# Patient Record
Sex: MALE | Race: WHITE | Employment: OTHER | URBAN - METROPOLITAN AREA
[De-identification: names, ages, dates, MRNs, and addresses within clinical notes are randomized per-mention and may not be internally consistent; named-entity substitution may affect disease eponyms.]

---

## 2017-02-22 RX ORDER — ROSUVASTATIN CALCIUM 40 MG/1
40 TABLET, COATED ORAL DAILY
Qty: 90 TAB | Refills: 1 | Status: SHIPPED | OUTPATIENT
Start: 2017-02-22 | End: 2017-02-23 | Stop reason: SDUPTHER

## 2017-02-23 RX ORDER — ROSUVASTATIN CALCIUM 40 MG/1
40 TABLET, COATED ORAL DAILY
Qty: 90 TAB | Refills: 1 | Status: SHIPPED | OUTPATIENT
Start: 2017-02-23 | End: 2018-03-30 | Stop reason: SDUPTHER

## 2017-03-20 ENCOUNTER — OFFICE VISIT (OUTPATIENT)
Dept: CARDIOLOGY CLINIC | Age: 82
End: 2017-03-20

## 2017-03-20 DIAGNOSIS — Z95.0 CARDIAC PACEMAKER IN SITU: Primary | ICD-10-CM

## 2017-06-17 ENCOUNTER — HOSPITAL ENCOUNTER (EMERGENCY)
Age: 82
Discharge: HOME | End: 2017-06-17
Attending: EMERGENCY MEDICINE
Payer: MEDICARE

## 2017-06-17 ENCOUNTER — APPOINTMENT (OUTPATIENT)
Dept: GENERAL RADIOLOGY | Age: 82
End: 2017-06-17
Attending: EMERGENCY MEDICINE
Payer: MEDICARE

## 2017-06-17 VITALS
WEIGHT: 183 LBS | HEIGHT: 70 IN | SYSTOLIC BLOOD PRESSURE: 120 MMHG | HEART RATE: 62 BPM | OXYGEN SATURATION: 98 % | DIASTOLIC BLOOD PRESSURE: 64 MMHG | BODY MASS INDEX: 26.2 KG/M2 | RESPIRATION RATE: 15 BRPM

## 2017-06-17 DIAGNOSIS — R07.9 CHEST PAIN, UNSPECIFIED TYPE: Primary | ICD-10-CM

## 2017-06-17 DIAGNOSIS — Z95.0 PACEMAKER: ICD-10-CM

## 2017-06-17 LAB
ALBUMIN SERPL BCP-MCNC: 3.7 G/DL (ref 3.5–5)
ALBUMIN/GLOB SERPL: 0.9 {RATIO} (ref 1.1–2.2)
ALP SERPL-CCNC: 80 U/L (ref 45–117)
ALT SERPL-CCNC: 17 U/L (ref 12–78)
ANION GAP BLD CALC-SCNC: 5 MMOL/L (ref 5–15)
AST SERPL W P-5'-P-CCNC: 18 U/L (ref 15–37)
BASOPHILS # BLD AUTO: 0 K/UL (ref 0–0.1)
BASOPHILS # BLD: 0 % (ref 0–1)
BILIRUB SERPL-MCNC: 0.3 MG/DL (ref 0.2–1)
BUN SERPL-MCNC: 32 MG/DL (ref 6–20)
BUN/CREAT SERPL: 22 (ref 12–20)
CALCIUM SERPL-MCNC: 8.5 MG/DL (ref 8.5–10.1)
CHLORIDE SERPL-SCNC: 100 MMOL/L (ref 97–108)
CO2 SERPL-SCNC: 32 MMOL/L (ref 21–32)
CREAT SERPL-MCNC: 1.43 MG/DL (ref 0.7–1.3)
EOSINOPHIL # BLD: 0.2 K/UL (ref 0–0.4)
EOSINOPHIL NFR BLD: 5 % (ref 0–7)
ERYTHROCYTE [DISTWIDTH] IN BLOOD BY AUTOMATED COUNT: 15.2 % (ref 11.5–14.5)
GLOBULIN SER CALC-MCNC: 4.3 G/DL (ref 2–4)
GLUCOSE SERPL-MCNC: 140 MG/DL (ref 65–100)
HCT VFR BLD AUTO: 35.2 % (ref 36.6–50.3)
HGB BLD-MCNC: 11.3 G/DL (ref 12.1–17)
LYMPHOCYTES # BLD AUTO: 24 % (ref 12–49)
LYMPHOCYTES # BLD: 1.2 K/UL (ref 0.8–3.5)
MAGNESIUM SERPL-MCNC: 2.2 MG/DL (ref 1.6–2.4)
MCH RBC QN AUTO: 28.7 PG (ref 26–34)
MCHC RBC AUTO-ENTMCNC: 32.1 G/DL (ref 30–36.5)
MCV RBC AUTO: 89.3 FL (ref 80–99)
MONOCYTES # BLD: 0.5 K/UL (ref 0–1)
MONOCYTES NFR BLD AUTO: 10 % (ref 5–13)
NEUTS SEG # BLD: 3.2 K/UL (ref 1.8–8)
NEUTS SEG NFR BLD AUTO: 61 % (ref 32–75)
PLATELET # BLD AUTO: 202 K/UL (ref 150–400)
POTASSIUM SERPL-SCNC: 4.2 MMOL/L (ref 3.5–5.1)
PROT SERPL-MCNC: 8 G/DL (ref 6.4–8.2)
RBC # BLD AUTO: 3.94 M/UL (ref 4.1–5.7)
SODIUM SERPL-SCNC: 137 MMOL/L (ref 136–145)
TROPONIN I SERPL-MCNC: <0.04 NG/ML
WBC # BLD AUTO: 5.3 K/UL (ref 4.1–11.1)

## 2017-06-17 PROCEDURE — 99284 EMERGENCY DEPT VISIT MOD MDM: CPT

## 2017-06-17 PROCEDURE — 84484 ASSAY OF TROPONIN QUANT: CPT | Performed by: EMERGENCY MEDICINE

## 2017-06-17 PROCEDURE — 36415 COLL VENOUS BLD VENIPUNCTURE: CPT | Performed by: EMERGENCY MEDICINE

## 2017-06-17 PROCEDURE — 93005 ELECTROCARDIOGRAM TRACING: CPT

## 2017-06-17 PROCEDURE — 83735 ASSAY OF MAGNESIUM: CPT | Performed by: EMERGENCY MEDICINE

## 2017-06-17 PROCEDURE — 85025 COMPLETE CBC W/AUTO DIFF WBC: CPT | Performed by: EMERGENCY MEDICINE

## 2017-06-17 PROCEDURE — 71020 XR CHEST PA LAT: CPT

## 2017-06-17 PROCEDURE — 80053 COMPREHEN METABOLIC PANEL: CPT | Performed by: EMERGENCY MEDICINE

## 2017-06-17 RX ORDER — SODIUM CHLORIDE 0.9 % (FLUSH) 0.9 %
5-10 SYRINGE (ML) INJECTION AS NEEDED
Status: DISCONTINUED | OUTPATIENT
Start: 2017-06-17 | End: 2017-06-18 | Stop reason: HOSPADM

## 2017-06-17 RX ORDER — TRIAMTERENE/HYDROCHLOROTHIAZID 37.5-25 MG
1 TABLET ORAL DAILY
COMMUNITY
End: 2019-09-14

## 2017-06-17 RX ORDER — FUROSEMIDE 20 MG/1
20 TABLET ORAL DAILY
COMMUNITY

## 2017-06-17 RX ORDER — SODIUM CHLORIDE 0.9 % (FLUSH) 0.9 %
5-10 SYRINGE (ML) INJECTION EVERY 8 HOURS
Status: DISCONTINUED | OUTPATIENT
Start: 2017-06-17 | End: 2017-06-18 | Stop reason: HOSPADM

## 2017-06-17 RX ORDER — RAMIPRIL 10 MG/1
10 CAPSULE ORAL DAILY
COMMUNITY

## 2017-06-18 NOTE — ED NOTES
10:24 PM  Change of shift. Care of patient taken over from Dr. Deshaun Page; H&P reviewed, handoff complete. Awaiting pacemaker interrogation. 11:26 PM  Pacer interrogated. No problems detected. Discussed results and plan with patient. Patient will be discharged home with cardiology followup. Patient instructed to return to the emergency room for any worsening symptoms or any other concerns.

## 2017-06-18 NOTE — DISCHARGE INSTRUCTIONS
We hope that we have addressed all of your medical concerns. The examination and treatment you received in the Emergency Department were for an emergent problem and were not intended as complete care. It is important that you follow up with your healthcare provider(s) for ongoing care. If your symptoms worsen or do not improve as expected, and you are unable to reach your usual health care provider(s), you should return to the Emergency Department. Today's healthcare is undergoing tremendous change, and patient satisfaction surveys are one of the many tools to assess the quality of medical care. You may receive a survey from the [employer] regarding your experience in the Emergency Department. I hope that your experience has been completely positive, particularly the medical care that I provided. As such, please participate in the survey; anything less than excellent does not meet my expectations or intentions. Formerly [facility] and [address] [place] [address] participate in nationally recognized quality of care measures. If your blood pressure is greater than 120/80, as reported below, we urge that you seek medical care to address the potential of high blood pressure, commonly known as hypertension. Hypertension can be hereditary or can be caused by certain medical conditions, pain, stress, or \"white coat syndrome. \"       Please make an appointment with your health care provider(s) for follow up of your Emergency Department visit. VITALS:   Patient Vitals for the past 8 hrs:   Pulse Resp BP SpO2   06/17/17 2100 64 21 123/64 96 %          Thank you for allowing us to provide you with medical care today. We realize that you have many choices for your emergency care needs. Please choose us in the future for any continued health care needs. Ric Plascencia, [address] [place] [place].   Office: [phone]            Recent Results (from the past 24 hour(s))   CBC WITH AUTOMATED DIFF    Collection Time: 06/17/17  9:12 PM   Result Value Ref Range    WBC 5.3 4.1 - 11.1 K/uL    RBC 3.94 (L) 4.10 - 5.70 M/uL    HGB 11.3 (L) 12.1 - 17.0 g/dL    HCT 35.2 (L) 36.6 - 50.3 %    MCV 89.3 80.0 - 99.0 FL    MCH 28.7 26.0 - 34.0 PG    MCHC 32.1 30.0 - 36.5 g/dL    RDW 15.2 (H) 11.5 - 14.5 %    PLATELET 563 684 - 653 K/uL    NEUTROPHILS 61 32 - 75 %    LYMPHOCYTES 24 12 - 49 %    MONOCYTES 10 5 - 13 %    EOSINOPHILS 5 0 - 7 %    BASOPHILS 0 0 - 1 %    ABS. NEUTROPHILS 3.2 1.8 - 8.0 K/UL    ABS. LYMPHOCYTES 1.2 0.8 - 3.5 K/UL    ABS. MONOCYTES 0.5 0.0 - 1.0 K/UL    ABS. EOSINOPHILS 0.2 0.0 - 0.4 K/UL    ABS. BASOPHILS 0.0 0.0 - 0.1 K/UL   METABOLIC PANEL, COMPREHENSIVE    Collection Time: 06/17/17  9:12 PM   Result Value Ref Range    Sodium 137 136 - 145 mmol/L    Potassium 4.2 3.5 - 5.1 mmol/L    Chloride 100 97 - 108 mmol/L    CO2 32 21 - 32 mmol/L    Anion gap 5 5 - 15 mmol/L    Glucose 140 (H) 65 - 100 mg/dL    BUN 32 (H) 6 - 20 MG/DL    Creatinine 1.43 (H) 0.70 - 1.30 MG/DL    BUN/Creatinine ratio 22 (H) 12 - 20      GFR est AA 57 (L) >60 ml/min/1.73m2    GFR est non-AA 47 (L) >60 ml/min/1.73m2    Calcium 8.5 8.5 - 10.1 MG/DL    Bilirubin, total 0.3 0.2 - 1.0 MG/DL    ALT (SGPT) 17 12 - 78 U/L    AST (SGOT) 18 15 - 37 U/L    Alk. phosphatase 80 45 - 117 U/L    Protein, total 8.0 6.4 - 8.2 g/dL    Albumin 3.7 3.5 - 5.0 g/dL    Globulin 4.3 (H) 2.0 - 4.0 g/dL    A-G Ratio 0.9 (L) 1.1 - 2.2     MAGNESIUM    Collection Time: 06/17/17  9:12 PM   Result Value Ref Range    Magnesium 2.2 1.6 - 2.4 mg/dL   TROPONIN I    Collection Time: 06/17/17  9:12 PM   Result Value Ref Range    Troponin-I, Qt. <0.04 <0.05 ng/mL       Xr Chest Pa Lat    Result Date: 6/17/2017  INDICATION: Pacemaker firing. COMPARISON: July 27, 2016 FINDINGS: PA and lateral views of the chest demonstrate a stable cardiomediastinal silhouette.  The patient is status post median sternotomy and CABG. Left-sided pacemaker is unchanged in position. There are small bilateral pleural effusions with mild bibasilar atelectasis. The visualized osseous structures are unremarkable. IMPRESSION: Left-sided pacemaker is stable in position. Small bilateral pleural effusions with mild bibasilar atelectasis. Chest Pain: Care Instructions  Your Care Instructions  There are many things that can cause chest pain. Some are not serious and will get better on their own in a few days. But some kinds of chest pain need more testing and treatment. Your doctor may have recommended a follow-up visit in the next 8 to 12 hours. If you are not getting better, you may need more tests or treatment. Even though your doctor has released you, you still need to watch for any problems. The doctor carefully checked you, but sometimes problems can develop later. If you have new symptoms or if your symptoms do not get better, get medical care right away. If you have worse or different chest pain or pressure that lasts more than 5 minutes or you passed out (lost consciousness), call 911 or seek other emergency help right away. A medical visit is only one step in your treatment. Even if you feel better, you still need to do what your doctor recommends, such as going to all suggested follow-up appointments and taking medicines exactly as directed. This will help you recover and help prevent future problems. How can you care for yourself at home? · Rest until you feel better. · Take your medicine exactly as prescribed. Call your doctor if you think you are having a problem with your medicine. · Do not drive after taking a prescription pain medicine. When should you call for help? Call 911 if:  · You passed out (lost consciousness). · You have severe difficulty breathing. · You have symptoms of a heart attack.  These may include:  ¨ Chest pain or pressure, or a strange feeling in your chest.  ¨ Sweating. ¨ Shortness of breath. ¨ Nausea or vomiting. ¨ Pain, pressure, or a strange feeling in your back, neck, jaw, or upper belly or in one or both shoulders or arms. ¨ Lightheadedness or sudden weakness. ¨ A fast or irregular heartbeat. After you call 911, the  may tell you to chew 1 adult-strength or 2 to 4 low-dose aspirin. Wait for an ambulance. Do not try to drive yourself. Call your doctor today if:  · You have any trouble breathing. · Your chest pain gets worse. · You are dizzy or lightheaded, or you feel like you may faint. · You are not getting better as expected. · You are having new or different chest pain. Where can you learn more? Go to http://vimal-tucker.info/. Enter A120 in the search box to learn more about \"Chest Pain: Care Instructions. \"  Current as of: May 27, 2016  Content Version: 11.2  © 8239-2852 [employer]. Care instructions adapted under license by [employer] (which disclaims liability or warranty for this information). If you have questions about a medical condition or this instruction, always ask your healthcare professional. [place] [address] any warranty or liability for your use of this information.

## 2017-06-18 NOTE — ED PROVIDER NOTES
HPI Comments: 80 y.o. male with past medical history significant for coronary atherosclerosis, HTN, hyperlipidemia, type II diabetes, atrial fibrillation, CAD, and bilateral cataracts who presents from home with chief complaint of pacemaker problem. Pt states that for the past 3 weeks his pacemaker will occasionally \"shock him\". Pt reports that the shocking increased in frequency and intensity today. He states that it shocked him almost 10 times and reports that the shocks are more painful than usual. Pt states that he has had this pacemaker for approximately 3 years. He reports having a machine which he uses at night that \"monitors my heart while I sleep\". Pt states that the monitor has not sounded any alarms recently. He denies having any chest pain, SOB, N/V/D, fever, cough, appetite change, or abdominal pain. There are no other acute medical concerns at this time. Social hx: former smoker, no EtOH use, no drug use  PCP: Eliceo Cunningham MD    Note written by Ly Bolanos, as dictated by Aishwarya Kearney MD 8:52 PM      The history is provided by the patient. No  was used. Past Medical History:   Diagnosis Date    Atrial fibrillation ([address]     CAD (coronary artery disease)     Cataracts, bilateral     Coronary atherosclerosis of native coronary artery     Essential hypertension, benign     Other and unspecified hyperlipidemia     PAF (paroxysmal atrial fibrillation) ([address] 6/23/2016    Postsurgical aortocoronary bypass status     Pre-operative cardiovascular examination     Type II or unspecified type diabetes mellitus without mention of complication, not stated as uncontrolled        Past Surgical History:   Procedure Laterality Date    CARDIAC SURG PROCEDURE UNLIST      bypass    HX CHOLECYSTECTOMY      HX ORTHOPAEDIC      back    HX PACEMAKER      INS PPM/ICD LED DUAL ONLY  11/12/2013              No family history on file.     Social History     Social History  Marital status:      Spouse name: N/A    Number of children: N/A    Years of education: N/A     Occupational History    Not on file. Social History Main Topics    Smoking status: Former Smoker     Quit date: 6/4/1972    Smokeless tobacco: Never Used    Alcohol use No    Drug use: No    Sexual activity: Not on file     Other Topics Concern    Not on file     Social History Narrative         ALLERGIES: Penicillins    Review of Systems   Constitutional: Negative for appetite change, chills and fever. Respiratory: Negative for cough and shortness of breath. Cardiovascular: Negative for chest pain. Gastrointestinal: Negative for abdominal pain, diarrhea, nausea and vomiting. All other systems reviewed and are negative. There were no vitals filed for this visit. Physical Exam   Constitutional: He is oriented to person, place, and time. He appears well-developed and well-nourished. No distress. HENT:   Head: Normocephalic and atraumatic. Eyes: Conjunctivae are normal. No scleral icterus. Neck: Neck supple. No tracheal deviation present. Cardiovascular: Normal rate, regular rhythm, normal heart sounds and intact distal pulses. Exam reveals no gallop and no friction rub. No murmur heard. Pacemaker in place, left upper chest.   Pulmonary/Chest: Effort normal and breath sounds normal. He has no wheezes. He has no rales. Abdominal: Soft. He exhibits no distension. There is no tenderness. There is no rebound and no guarding. Musculoskeletal: He exhibits no edema. Neurological: He is alert and oriented to person, place, and time. Skin: Skin is warm and dry. No rash noted. Psychiatric: He has a normal mood and affect. Nursing note and vitals reviewed. Note written by Ly Adams, as dictated by Serafin Nation MD 8:52 PM    [facility]  ED Course       Procedures  ED EKG interpretation:  Rhythm: atrial paced; and regular .  Rate (approx.): 67; Axis: normal; ST/T wave: normal.  Note written by Ly Le, as dictated by Nicky Ramos MD 8:52 PM    CONSULT NOTE:  9:16 Gillian Xavier MD spoke with Dr. Love Morales, Consult for Cardiology. Discussed available diagnostic tests and clinical findings. He is in agreement with care plans as outlined. Agrees with plans for interrogation. A/P: pacemaker \"shocks\" for 3 weeks, became more frequent today; appears well, VSS; EKG shows paced rhythm; CXR shows leads in good position; CBC, CMP, trop ok; awaiting interrogation.   Nicky Ramos MD  9:55 PM

## 2017-06-19 LAB
ATRIAL RATE: 67 BPM
CALCULATED P AXIS, ECG09: 58 DEGREES
CALCULATED R AXIS, ECG10: 14 DEGREES
CALCULATED T AXIS, ECG11: 50 DEGREES
DIAGNOSIS, 93000: NORMAL
P-R INTERVAL, ECG05: 254 MS
Q-T INTERVAL, ECG07: 406 MS
QRS DURATION, ECG06: 94 MS
QTC CALCULATION (BEZET), ECG08: 429 MS
VENTRICULAR RATE, ECG03: 67 BPM

## 2017-06-22 ENCOUNTER — OFFICE VISIT (OUTPATIENT)
Dept: CARDIOLOGY CLINIC | Age: 82
End: 2017-06-22

## 2017-06-22 ENCOUNTER — CLINICAL SUPPORT (OUTPATIENT)
Dept: CARDIOLOGY CLINIC | Age: 82
End: 2017-06-22

## 2017-06-22 VITALS
HEART RATE: 64 BPM | WEIGHT: 186 LBS | SYSTOLIC BLOOD PRESSURE: 120 MMHG | BODY MASS INDEX: 26.63 KG/M2 | OXYGEN SATURATION: 98 % | HEIGHT: 70 IN | DIASTOLIC BLOOD PRESSURE: 60 MMHG | RESPIRATION RATE: 16 BRPM

## 2017-06-22 DIAGNOSIS — Z95.1 HX OF CABG: ICD-10-CM

## 2017-06-22 DIAGNOSIS — I49.5 SINUS NODE DYSFUNCTION (HCC): ICD-10-CM

## 2017-06-22 DIAGNOSIS — I10 ESSENTIAL HYPERTENSION, BENIGN: ICD-10-CM

## 2017-06-22 DIAGNOSIS — I25.10 ATHEROSCLEROSIS OF NATIVE CORONARY ARTERY OF NATIVE HEART WITHOUT ANGINA PECTORIS: ICD-10-CM

## 2017-06-22 DIAGNOSIS — N18.30 CKD (CHRONIC KIDNEY DISEASE) STAGE 3, GFR 30-59 ML/MIN (HCC): ICD-10-CM

## 2017-06-22 DIAGNOSIS — Z95.0 PACEMAKER: Primary | ICD-10-CM

## 2017-06-22 DIAGNOSIS — R29.6 FREQUENT FALLS: ICD-10-CM

## 2017-06-22 DIAGNOSIS — I48.0 PAF (PAROXYSMAL ATRIAL FIBRILLATION) (HCC): ICD-10-CM

## 2017-06-22 DIAGNOSIS — Z95.0 CARDIAC PACEMAKER IN SITU: Primary | ICD-10-CM

## 2017-06-22 NOTE — PROGRESS NOTES
Cardiac Electrophysiology Office  Note     Subjective:      Юлия Orta is a 80 y.o. man whom I had seen in 2013 and he was referred by Dr. Kiara Raza for sick sinus syndrome and symptoms of fatigue, sinus bradycardia. The patient has paroxysmal atrial fibrillation on medical therapy, Sotalol. He has not been taking this medication for months and his wife said he does not see Dr Kiara Raza either. He lives near [facility] and has been having pacer checked at [facility] office although he prefers to see me at [facility] office as it is more convenient to come this way via [address] or [place]  He has diabetes mellitus and prior CABG with Dr Sherman Expose years ago     He is here today for follow up. He was recently in the ED 6/17/17 with the c/o his pacemaker \"shocking\" him. The shocking sensation is very brief lasting a couple seconds. Associated symptoms include SOB and dizziness. No LE edema  He denies chest pain. He says he does get a little SOB when walking around but this is d/t his exposure to Asbestos exposure. He works out in the yard, cutting up trees and doing yard work. He does fall at least once a week. He denies lightheadedness, dizziness or LOC preceding the fall. He is followed by Dr Kanika Lainez, pulmonary associates. Lexiscan stress test 12/2016 normal LVEF and no ischemia. Echo 7/2016 LVEF 60 % to 65 %. No RWMA. Aortic sclerosis.      Patient Active Problem List   Diagnosis Code    Coronary atherosclerosis of native coronary artery I25.10    Postsurgical aortocoronary bypass status Z95.1    Essential hypertension, benign I10    Other and unspecified hyperlipidemia E78.5    Type II or unspecified type diabetes mellitus without mention of complication, not stated as uncontrolled E11.9    S/P placement of cardiac pacemaker Z95.0    Sinus node dysfunction (HCC) I49.5    PAF (paroxysmal atrial fibrillation) ([facility] I48.0     Current Outpatient Prescriptions   Medication Sig Dispense Refill    furosemide (LASIX) 20 mg tablet Take 20 mg by mouth daily.  triamterene-hydroCHLOROthiazide (MAXZIDE) 37.5-25 mg per tablet Take 1 Tab by mouth daily.  ramipril (ALTACE) 10 mg capsule Take 10 mg by mouth daily.  rosuvastatin (CRESTOR) 40 mg tablet Take 1 Tab by mouth daily. (Patient taking differently: Take 20 mg by mouth daily.) 90 Tab 1    nitroglycerin (NITROSTAT) 0.4 mg SL tablet 1 Tab by SubLINGual route every five (5) minutes as needed for Chest Pain. 25 Tab 3    aspirin delayed-release 81 mg tablet Take  by mouth daily.  pioglitazone (ACTOS) 30 mg tablet Take  by mouth daily. Allergies   Allergen Reactions    Penicillins Rash     Past Medical History:   Diagnosis Date    [facility]     CAD (coronary artery disease)     Cataracts, bilateral     Coronary atherosclerosis of native coronary artery     Essential hypertension, benign     Other and unspecified hyperlipidemia     PAF (paroxysmal atrial fibrillation) ([facility] 6/23/2016    Postsurgical aortocoronary bypass status     Pre-operative cardiovascular examination     Type II or unspecified type diabetes mellitus without mention of complication, not stated as uncontrolled      Past Surgical History:   Procedure Laterality Date    CARDIAC SURG PROCEDURE UNLIST      bypass    HX CHOLECYSTECTOMY      HX ORTHOPAEDIC      back    HX PACEMAKER      INS PPM/ICD LED DUAL ONLY  11/12/2013          No family history of pacer   Social History   Substance Use Topics    Smoking status: Former Smoker     Quit date: 6/4/1972    Smokeless tobacco: Never Used    Alcohol use No        Review of Systems:   Constitutional: Negative for fever, chills, weight loss, malaise/fatigue. HEENT: Negative for nosebleeds, vision changes. Respiratory: Negative for cough, hemoptysis, sputum production, and wheezing. Cardiovascular: Negative for syncope, and PND.    Gastrointestinal: Negative for nausea, vomiting, diarrhea, blood in stool and melena. Genitourinary: Negative for dysuria, and hematuria. Musculoskeletal: Negative for myalgias   Skin: Negative for rash. Heme: Does not bleed or bruise easily. Neurological: Negative for speech change and focal weakness     Objective:     Visit Vitals    /60 (BP 1 Location: Left arm, BP Patient Position: Sitting)    Pulse 64    Resp 16    Ht 5' 10\" (1.778 m)    Wt 186 lb (84.4 kg)    SpO2 98%    BMI 26.69 kg/m2        Physical Exam:   Constitutional: Well-nourished. No distress. Head: Normocephalic and atraumatic. Eyes: Pupils are equal, round  Neck: supple. No JVD present. Cardiovascular: normal rate, regular rhythm and normal heart sounds. Exam reveals no gallop and no friction rub. No murmur heard. Pulmonary/Chest: Effort normal and diminished bases. + wheezing throughout . Abdominal: Soft, no tenderness. Musculoskeletal: no edema. Neurological: alert,oriented. Skin: Skin is warm and dry unremarkable left sided pacer  Psychiatric: normal mood and affect. Behavior is normal. Judgment and thought content normal.       EKG 6/19/17: atrial paced rate 67 bpm prolonged AV delay      Assessment/Plan:       ICD-10-CM ICD-9-CM    1. Pacemaker Z95.0 V45.01    2. PAF (paroxysmal atrial fibrillation) ([facility]) I48.0 427.31    3. Sinus node dysfunction ([facility]) I49.5 427.81    4. Essential hypertension, benign I10 401.1    5. Atherosclerosis of native coronary artery of native heart without angina pectoris I25.10 414.01    6. Hx of CABG Z95.1 V45.81    7. Frequent falls R29.6 V15.88    8. CKD (chronic kidney disease) stage 3, GFR 30-59 ml/min N18.3 585.3      reviewed medications and side effects in detail   Device check shows proper functioning, 78% Atrial pacing. 20 episodes of of atrial fibrillation/flutter, ventricular rate controlled. Longest episode 1 hour. No OAC d/t frequent falls. He on ASA. BP controlled. He is compensated on exam with no acute s/s of CHF.  Echo 2016 shows LVEF 60%. Follow-up Disposition: Not on File    Thank you for involving me in this patient's care and please call with further concerns or questions. Nakia Serrano M.D.   Electrophysiology/Cardiology  [facility]  [facility], [facility] [address] [place] [address]  [phone]

## 2017-06-22 NOTE — PROGRESS NOTES
Cardiac Electrophysiology Office Note     Subjective:      Afshan Alas is a 80 y.o. man whom I had seen in 2013 and he was referred by Dr. Amanda Pedro for sick sinus syndrome and symptoms of fatigue, sinus bradycardia. The patient has paroxysmal atrial fibrillation on medical therapy, Sotalol. He has not been taking this medication for months and his wife said he does not see Dr Amanda Pedro either. He lives near [facility] and has been having pacer checked at [facility] office although he prefers to see me at [facility] office as it is more convenient to come this way via [address] or [place]  He has diabetes mellitus and prior CABG with Dr Lakisha Vela years ago    He is here today for follow up. He was recently in the ED 6/17/17 with the c/o his pacemaker \"shocking\" him. The shocking sensation is very brief lasting a couple seconds. Associated symptoms include SOB and dizziness. He picked up bricks and pain was seconds  No MI  Pacer was checked and showed no problem  Since then there is no more pain    No LE edema    He denies lightheadedness, dizziness or LOC preceding the fall. He is followed by Dr Beatrice Cee, pulmonary associates. Lexiscan stress test 12/2016 normal LVEF and no ischemia. Echo 7/2016 LVEF 60 % to 65 %. No RWMA. Aortic sclerosis. Patient Active Problem List   Diagnosis Code    Coronary atherosclerosis of native coronary artery I25.10    Postsurgical aortocoronary bypass status Z95.1    Essential hypertension, benign I10    Other and unspecified hyperlipidemia E78.5    Type II or unspecified type diabetes mellitus without mention of complication, not stated as uncontrolled E11.9    S/P placement of cardiac pacemaker Z95.0    Sinus node dysfunction (HCC) I49.5    PAF (paroxysmal atrial fibrillation) (HCC) I48.0     Current Outpatient Prescriptions   Medication Sig Dispense Refill    furosemide (LASIX) 20 mg tablet Take 20 mg by mouth daily.       triamterene-hydroCHLOROthiazide (MAXZIDE) 37.5-25 mg per tablet Take 1 Tab by mouth daily.  ramipril (ALTACE) 10 mg capsule Take 10 mg by mouth daily.  rosuvastatin (CRESTOR) 40 mg tablet Take 1 Tab by mouth daily. (Patient taking differently: Take 20 mg by mouth daily.) 90 Tab 1    nitroglycerin (NITROSTAT) 0.4 mg SL tablet 1 Tab by SubLINGual route every five (5) minutes as needed for Chest Pain. 25 Tab 3    aspirin delayed-release 81 mg tablet Take  by mouth daily.  pioglitazone (ACTOS) 30 mg tablet Take  by mouth daily. Allergies   Allergen Reactions    Penicillins Rash     Past Medical History:   Diagnosis Date    Atrial fibrillation ([facility]     CAD (coronary artery disease)     Cataracts, bilateral     Coronary atherosclerosis of native coronary artery     Essential hypertension, benign     Other and unspecified hyperlipidemia     PAF (paroxysmal atrial fibrillation) ([facility] 6/23/2016    Postsurgical aortocoronary bypass status     Pre-operative cardiovascular examination     Type II or unspecified type diabetes mellitus without mention of complication, not stated as uncontrolled      Past Surgical History:   Procedure Laterality Date    CARDIAC SURG PROCEDURE UNLIST      bypass    HX CHOLECYSTECTOMY      HX ORTHOPAEDIC      back    HX PACEMAKER      INS PPM/ICD LED DUAL ONLY  11/12/2013          No family history of pacer   Social History   Substance Use Topics    Smoking status: Former Smoker     Quit date: 6/4/1972    Smokeless tobacco: Never Used    Alcohol use No        Review of Systems:   Constitutional: Negative for fever, chills, weight loss, malaise/fatigue. HEENT: Negative for nosebleeds, vision changes. Respiratory: Negative for cough, hemoptysis, sputum production, and wheezing. Cardiovascular: Negative for syncope, and PND. Gastrointestinal: Negative for nausea, vomiting, diarrhea, blood in stool and melena. Genitourinary: Negative for dysuria, and hematuria.    Musculoskeletal: Negative for myalgias   Skin: Negative for rash. Heme: Does not bleed or bruise easily. Neurological: Negative for speech change and focal weakness     Objective:     Visit Vitals    /60 (BP 1 Location: Left arm, BP Patient Position: Sitting)    Pulse 64    Resp 16    Ht 5' 10\" (1.778 m)    Wt 186 lb (84.4 kg)    SpO2 98%    BMI 26.69 kg/m2        Physical Exam:   Constitutional: Well-nourished. No distress. Head: Normocephalic and atraumatic. Eyes: Pupils are equal, round  Neck: supple. No JVD present. Cardiovascular: normal rate, regular rhythm and normal heart sounds. Exam reveals no gallop and no friction rub. No murmur heard. Pulmonary/Chest: Effort normal and diminished bases   Abdominal: Soft, no tenderness. Musculoskeletal: no edema. Neurological: alert,oriented. Skin: Skin is warm and dry unremarkable left sided pacer  Psychiatric: normal mood and affect. Behavior is normal. Judgment and thought content normal.          Assessment/Plan:       ICD-10-CM ICD-9-CM    1. Pacemaker Z95.0 V45.01    2. PAF (paroxysmal atrial fibrillation) ([facility]) I48.0 427.31    3. Sinus node dysfunction ([facility]) I49.5 427.81    4. Essential hypertension, benign I10 401.1    5. Atherosclerosis of native coronary artery of native heart without angina pectoris I25.10 414.01    6. Hx of CABG Z95.1 V45.81    7. Frequent falls R29.6 V15.88    8. CKD (chronic kidney disease) stage 3, GFR 30-59 ml/min N18.3 585.3      reviewed medications and side effects in detail   He did not appear to have angina acute MI  lexican cardiolite stress test 12/2016  No ischemia  LVEF 63%  Overall study is normal  Device check shows proper functioning of leads and device, 78% Atrial pacing. 20 episodes of of atrial fibrillation/flutter, ventricular rate controlled. Longest episode 1 hour. No OAC d/t frequent falls. He on ASA. BP controlled. He is compensated on exam with no acute s/s of CHF. Echo 2016 shows LVEF 60%.   If he has unusual chest pain call me or go to ER again       Follow-up Disposition:  Return in about 1 year (around 6/22/2018). Thank you for involving me in this patient's care and please call with further concerns or questions. Aniyah Mcguire M.D.   Electrophysiology/Cardiology  [facility] [place]  [address] [place] [address]  [phone]

## 2017-06-22 NOTE — MR AVS SNAPSHOT
Visit Information Date & Time Provider Department Dept. Phone Encounter #  
 6/22/2017  1:40 PM Adrianne Grant MD [facility] [phone] [id number] Follow-up Instructions Return in about 1 year (around 6/22/2018). Your Appointments 7/19/2018 11:15 AM  
PACEMAKER with LORI3MARTINA [facility] ([facility]) Appt Note: bio rito tilley, annual thresh/HM, see [address] [place]  
[address]  
  
    
 7/19/2018 11:20 AM  
ESTABLISHED PATIENT with Adrianne Grant MD  
[facility] ([facility]) Appt Note: bio rito tilley, annual thresh/HM, see [address] [place]  
[address] [address]  
  
    
  
 9/27/2017 11:30 AM  
REMOTE OFFICE VISIT with Roldan Penn [facility] ([facility] SCHEDULING) Appt Note: BIO ppirito, b 6/22/17  
 [address] [place]  
[address] [address]  
  
    
 1/3/2018 10:45 AM  
REMOTE OFFICE VISIT with Roldan Penn [facility] ([facility] SCHEDULING) Appt Note: bio rito tilley  
 [address] [place]  
[phone]  
  
    
 4/11/2018  9:00 AM  
REMOTE OFFICE VISIT with Roldan Penn [facility] ([facility]IE SCHEDULING) Appt Note: bio rito tilley  
 [address] [place]  
[phone] Upcoming Health Maintenance Date Due  
 FOOT EXAM Q1 4/28/1944 EYE EXAM RETINAL OR DILATED Q1 4/28/1944 DTaP/Tdap/Td series (1 - Tdap) 4/28/1955 ZOSTER VACCINE AGE 60> 4/28/1994 GLAUCOMA SCREENING Q2Y 4/28/1999 Pneumococcal 65+ High/Highest Risk (1 of 2 - PCV13) 4/28/1999 MEDICARE YEARLY EXAM 4/28/1999 HEMOGLOBIN A1C Q6M 11/28/2013 MICROALBUMIN Q1 5/28/2014 LIPID PANEL Q1 6/24/2017 INFLUENZA AGE 9 TO ADULT 8/1/2017 Allergies as of 6/22/2017  Review Complete On: 6/22/2017 By: Giovanna Laguna MD  
  
 Severity Noted Reaction Type Reactions Penicillins  10/07/2010    Rash Current Immunizations  Never Reviewed No immunizations on file. Not reviewed this visit You Were Diagnosed With   
  
 Codes Comments Pacemaker    -  Primary ICD-10-CM: Z95.0 ICD-9-CM: V45.01   
 PAF (paroxysmal atrial fibrillation) (HCC)     ICD-10-CM: I48.0 ICD-9-CM: 427.31 Sinus node dysfunction (HCC)     ICD-10-CM: I49.5 ICD-9-CM: 427.81 Essential hypertension, benign     ICD-10-CM: I10 
ICD-9-CM: 401.1 Atherosclerosis of native coronary artery of native heart without angina pectoris     ICD-10-CM: I25.10 ICD-9-CM: 414.01 Hx of CABG     ICD-10-CM: Z95.1 ICD-9-CM: V45.81 Frequent falls     ICD-10-CM: R29.6 ICD-9-CM: V15.88   
 CKD (chronic kidney disease) stage 3, GFR 30-59 ml/min     ICD-10-CM: N18.3 ICD-9-CM: 314. 3 Vitals BP Pulse Resp Height(growth percentile) Weight(growth percentile) SpO2  
 120/60 (BP 1 Location: Left arm, BP Patient Position: Sitting) 64 16 5' 10\" (1.778 m) 186 lb (84.4 kg) 98% BMI Smoking Status 26.69 kg/m2 Former Smoker BMI and BSA Data Body Mass Index Body Surface Area  
 26.69 kg/m 2 2.04 m 2 Preferred Pharmacy Pharmacy Name Phone [id number], [address] [facility] [phone] Your Updated Medication List  
  
   
This list is accurate as of: 6/22/17  2:35 PM.  Always use your most recent med list.  
  
  
  
  
 ACTOS 30 mg tablet Generic drug:  pioglitazone Take  by mouth daily. aspirin delayed-release 81 mg tablet Take  by mouth daily. LASIX 20 mg tablet Generic drug:  furosemide Take 20 mg by mouth daily. nitroglycerin 0.4 mg SL tablet Commonly known as:  NITROSTAT  
1 Tab by SubLINGual route every five (5) minutes as needed for Chest Pain. ramipril 10 mg capsule Commonly known as:  ALTACE Take 10 mg by mouth daily. rosuvastatin 40 mg tablet Commonly known as:  CRESTOR Take 1 Tab by mouth daily. triamterene-hydroCHLOROthiazide 37.5-25 mg per tablet Commonly known as:  Tj Rolling Take 1 Tab by mouth daily. Follow-up Instructions Return in about 1 year (around 6/22/2018). Patient Instructions You will need to follow up in clinic with Dr. Reema Ward in 1 year. Introducing [facility]! Joselito Meraz introduces Raise Your Flag patient portal. Now you can access parts of your medical record, email your doctor's office, and request medication refills online. 1. In your internet browser, go to [email or web address]. 2. Click on the First Time User? Click Here link in the Sign In box. You will see the New Member Sign Up page. 3. Enter your Raise Your Flag Access Code exactly as it appears below. You will not need to use this code after youve completed the sign-up process. If you do not sign up before the expiration date, you must request a new code. · Raise Your Flag Access Code: [email or web address] Expires: 9/20/2017  2:27 PM 
 
4. Enter the last four digits of your Social Security Number (xxxx) and Date of Birth (mm/dd/yyyy) as indicated and click Submit. You will be taken to the next sign-up page. 5. Create a CircleCIt ID. This will be your Raise Your Flag login ID and cannot be changed, so think of one that is secure and easy to remember. 6. Create a Raise Your Flag password. You can change your password at any time. 7. Enter your Password Reset Question and Answer. This can be used at a later time if you forget your password. 8. Enter your e-mail address. You will receive e-mail notification when new information is available in [address] 9. Click Sign Up. You can now view and download portions of your medical record. 10. Click the Download Summary menu link to download a portable copy of your medical information. If you have questions, please visit the Frequently Asked Questions section of the Coghead website. Remember, Coghead is NOT to be used for urgent needs. For medical emergencies, dial 911. Now available from your iPhone and Android! Please provide this summary of care documentation to your next provider. Your primary care clinician is listed as Phys Other. If you have any questions after today's visit, please call [phone].

## 2017-09-27 ENCOUNTER — OFFICE VISIT (OUTPATIENT)
Dept: CARDIOLOGY CLINIC | Age: 82
End: 2017-09-27

## 2017-09-27 DIAGNOSIS — Z95.0 CARDIAC PACEMAKER IN SITU: Primary | ICD-10-CM

## 2018-01-04 ENCOUNTER — OFFICE VISIT (OUTPATIENT)
Dept: CARDIOLOGY CLINIC | Age: 83
End: 2018-01-04

## 2018-01-04 DIAGNOSIS — Z95.0 CARDIAC PACEMAKER IN SITU: Primary | ICD-10-CM

## 2018-03-30 DIAGNOSIS — I25.10 ATHEROSCLEROSIS OF NATIVE CORONARY ARTERY OF NATIVE HEART WITHOUT ANGINA PECTORIS: Primary | ICD-10-CM

## 2018-03-30 RX ORDER — ROSUVASTATIN CALCIUM 40 MG/1
40 TABLET, COATED ORAL DAILY
Qty: 90 TAB | Refills: 1 | Status: SHIPPED | OUTPATIENT
Start: 2018-03-30

## 2018-03-30 NOTE — TELEPHONE ENCOUNTER
Pharmacy verified. Elbridge Burkitt with [facility] called in to resubmit this refill. Thanks!   Phone [phone]  Radhika Sanchez

## 2018-03-30 NOTE — TELEPHONE ENCOUNTER
Request for Crestor 40 mg daily. Last office visit 6/22/17, next office visit 7/5/18. Last Lipid Panel 6/24/16. VO per Dr. Kirk Reyes letter and lab slip mailed to patient for updated LP. Refills per verbal order from Dr. Kirk Reyes.

## 2018-03-30 NOTE — LETTER
3/30/2018 11:24 AM 
 
Mr. Briseida Lawrence [facility] [address] [facility] [address] You are on a medication which requires routine monitoring of lab work. Your last Lipid panel was on 6/24/16 and we recommend this be done every year. Enclosed you will find lab slips which you may take to the [facility] location closest to your residence. Please have them done as soon as you are able, to avoid any delay in our ability to refill your medications.   If you have had them completed recently with PCP, please have them faxed to our office at [phone].; 
 
 
 
 
Sincerely, 
 
 
Aj Nunez MD

## 2018-04-04 ENCOUNTER — OFFICE VISIT (OUTPATIENT)
Dept: CARDIOLOGY CLINIC | Age: 83
End: 2018-04-04

## 2018-04-04 DIAGNOSIS — Z95.0 CARDIAC PACEMAKER IN SITU: Primary | ICD-10-CM

## 2018-07-12 ENCOUNTER — CLINICAL SUPPORT (OUTPATIENT)
Dept: CARDIOLOGY CLINIC | Age: 83
End: 2018-07-12

## 2018-07-12 ENCOUNTER — OFFICE VISIT (OUTPATIENT)
Dept: CARDIOLOGY CLINIC | Age: 83
End: 2018-07-12

## 2018-07-12 VITALS
HEART RATE: 69 BPM | DIASTOLIC BLOOD PRESSURE: 60 MMHG | RESPIRATION RATE: 16 BRPM | WEIGHT: 180 LBS | BODY MASS INDEX: 25.77 KG/M2 | SYSTOLIC BLOOD PRESSURE: 100 MMHG | HEIGHT: 70 IN | OXYGEN SATURATION: 98 %

## 2018-07-12 DIAGNOSIS — I25.10 ATHEROSCLEROSIS OF NATIVE CORONARY ARTERY OF NATIVE HEART WITHOUT ANGINA PECTORIS: ICD-10-CM

## 2018-07-12 DIAGNOSIS — Z95.0 CARDIAC PACEMAKER IN SITU: Primary | ICD-10-CM

## 2018-07-12 DIAGNOSIS — Z95.0 PACEMAKER: Primary | ICD-10-CM

## 2018-07-12 DIAGNOSIS — I10 ESSENTIAL HYPERTENSION, BENIGN: ICD-10-CM

## 2018-07-12 DIAGNOSIS — Z95.1 POSTSURGICAL AORTOCORONARY BYPASS STATUS: ICD-10-CM

## 2018-07-12 DIAGNOSIS — I48.0 PAF (PAROXYSMAL ATRIAL FIBRILLATION) (HCC): ICD-10-CM

## 2018-07-12 DIAGNOSIS — I49.5 SINUS NODE DYSFUNCTION (HCC): ICD-10-CM

## 2018-07-12 NOTE — MR AVS SNAPSHOT
[address] [facility] [address] [place] 
[phone] Patient: Jeny Hayes Sr. MRN: [id number] MARTIN:[birth year] Visit Information Date & Time Provider Department Dept. Phone Encounter #  
 7/12/2018  1:00 PM Teofilo Mosqueda MD [facility] Ef[facility] Santiago [phone] [id number]  
  
 10/15/2018 11:45 AM  
REMOTE OFFICE VISIT with Marcingenaro Head [facility] ([facility] SCHEDULING) Appt Note: HM PPI/rc b 7-12-18  (Siello 60 month  9-12-18 to 1-10-19) [address] [place] [address]  
[address] [place] [address]  
  
    
 1/21/2019  1:45 PM  
REMOTE OFFICE VISIT with Marcin Head [facility] ([facility] SCHEDULING) Appt Note: Siello ended   HM PPI/rc b  
 [address] [place]  
[phone]  
  
    
 4/24/2019  1:00 PM  
REMOTE OFFICE VISIT with Marcingenaro Head [facility] ([facility] SCHEDULING) Appt Note: HM PPI/rc b  
 [address] [place]  
[phone] Upcoming Health Maintenance Date Due  
 FOOT EXAM Q1 4/28/1944 EYE EXAM RETINAL OR DILATED Q1 4/28/1944 DTaP/Tdap/Td series (1 - Tdap) 4/28/1955 ZOSTER VACCINE AGE 60> 2/28/1994 GLAUCOMA SCREENING Q2Y 4/28/1999 Pneumococcal 65+ Low/Medium Risk (1 of 2 - PCV13) 4/28/1999 MICROALBUMIN Q1 5/28/2014 LIPID PANEL Q1 6/24/2017 MEDICARE YEARLY EXAM 3/14/2018 HEMOGLOBIN A1C Q6M 3/26/2018 Influenza Age 5 to Adult 8/1/2018 Allergies as of 7/12/2018  Review Complete On: 7/12/2018 By: Teofilo Mosqueda MD  
  
 Severity Noted Reaction Type Reactions Penicillins  10/07/2010    Rash Current Immunizations  Never Reviewed No immunizations on file. Not reviewed this visit Vitals BP Pulse Resp Height(growth percentile) Weight(growth percentile) SpO2  
 100/60 (BP 1 Location: Left arm, BP Patient Position: Sitting) 69 16 5' 10\" (1.778 m) 180 lb (81.6 kg) 98% BMI Smoking Status 25.83 kg/m2 Former Smoker BMI and BSA Data Body Mass Index Body Surface Area  
 25.83 kg/m 2 2.01 m 2 Preferred Pharmacy Pharmacy Name Phone [id number] [place], [address] [facility] [phone] Your Updated Medication List  
  
   
This list is accurate as of 7/12/18  1:46 PM.  Always use your most recent med list.  
  
  
  
  
 ACTOS 30 mg tablet Generic drug:  pioglitazone Take  by mouth daily. aspirin delayed-release 81 mg tablet Take  by mouth daily. LASIX 20 mg tablet Generic drug:  furosemide Take 20 mg by mouth daily. ramipril 10 mg capsule Commonly known as:  ALTACE Take 10 mg by mouth daily. rosuvastatin 40 mg tablet Commonly known as:  CRESTOR Take 1 Tab by mouth daily. triamterene-hydroCHLOROthiazide 37.5-25 mg per tablet Commonly known as:  Cyn Ruse Take 1 Tab by mouth daily. Introducing [facility]! [facility] introduces Bi[employer]itor patient portal. Now you can access parts of your medical record, email your doctor's office, and request medication refills online. 1. In your internet browser, go to [email or web address] 2. Click on the First Time User? Click Here link in the Sign In box. You will see the New Member Sign Up page. 3. Enter your Bi[employer]itor Access Code exactly as it appears below. You will not need to use this code after youve completed the sign-up process. If you do not sign up before the expiration date, you must request a new code. · Bi[employer]itor Access Code: [email or web address] Expires: 10/10/2018  1:46 PM 
 
4.  Enter the last four digits of your Social Security Number (xxxx) and Date of Birth (mm/dd/yyyy) as indicated and click Submit. You will be taken to the next sign-up page. 5. Create a Inkblazers ID. This will be your Inkblazers login ID and cannot be changed, so think of one that is secure and easy to remember. 6. Create a Inkblazers password. You can change your password at any time. 7. Enter your Password Reset Question and Answer. This can be used at a later time if you forget your password. 8. Enter your e-mail address. You will receive e-mail notification when new information is available in [address] 9. Click Sign Up. You can now view and download portions of your medical record. 10. Click the Download Summary menu link to download a portable copy of your medical information. If you have questions, please visit the Frequently Asked Questions section of the Inkblazers website. Remember, Inkblazers is NOT to be used for urgent needs. For medical emergencies, dial 911. Now available from your iPhone and Android! Please provide this summary of care documentation to your next provider. Your primary care clinician is listed as Phys Other. If you have any questions after today's visit, please call [phone].

## 2018-07-13 NOTE — PROGRESS NOTES
Cardiac Electrophysiology Office Note     Subjective:      Meaghan Rosa is a 80 y.o. man who is here for management of his pacemaker and he also sees me for CAD hx of CABG  I had seen in 2013 and he was referred by Dr. Chey Villaseñor for sick sinus syndrome and symptoms of fatigue, sinus bradycardia. The patient has paroxysmal atrial fibrillation on medical therapy, Sotalol. He has not been taking this medication for months and his wife said he does not see Dr Chey Villaseñor either. He lives near [address] [place] [address] and had been having pacer checked at [facility] office although he prefers to see me at [facility] office as it is more convenient to come this way via [address] or [address]  He has diabetes mellitus and prior CABG with Dr Pearl Hernandez years ago  He is taking aspirin    He is here today for follow up after one year. He denies lightheadedness, dizziness or LOC   He is followed by Dr Rashmi Hopkins, pulmonary associates. Lexiscan stress test 12/2016 normal LVEF and no ischemia. Echo 7/2016 LVEF 60 % to 65 %. No RWMA. Aortic sclerosis. Patient Active Problem List   Diagnosis Code    Coronary atherosclerosis of native coronary artery I25.10    Postsurgical aortocoronary bypass status Z95.1    Essential hypertension, benign I10    Other and unspecified hyperlipidemia E78.5    Type II or unspecified type diabetes mellitus without mention of complication, not stated as uncontrolled E11.9    S/P placement of cardiac pacemaker Z95.0    Sinus node dysfunction (HCC) I49.5    PAF (paroxysmal atrial fibrillation) (HCC) I48.0     Current Outpatient Prescriptions   Medication Sig Dispense Refill    rosuvastatin (CRESTOR) 40 mg tablet Take 1 Tab by mouth daily. 90 Tab 1    furosemide (LASIX) 20 mg tablet Take 20 mg by mouth daily.  triamterene-hydroCHLOROthiazide (MAXZIDE) 37.5-25 mg per tablet Take 1 Tab by mouth daily.  ramipril (ALTACE) 10 mg capsule Take 10 mg by mouth daily.       aspirin delayed-release 81 mg tablet Take  by mouth daily.  pioglitazone (ACTOS) 30 mg tablet Take  by mouth daily. Allergies   Allergen Reactions    Penicillins Rash     Past Medical History:   Diagnosis Date    [facility]     CAD (coronary artery disease)     Cataracts, bilateral     Coronary atherosclerosis of native coronary artery     Essential hypertension, benign     Other and unspecified hyperlipidemia     PAF (paroxysmal atrial fibrillation) ([facility] 6/23/2016    Postsurgical aortocoronary bypass status     Pre-operative cardiovascular examination     Type II or unspecified type diabetes mellitus without mention of complication, not stated as uncontrolled      Past Surgical History:   Procedure Laterality Date    CARDIAC SURG PROCEDURE UNLIST      bypass    HX CHOLECYSTECTOMY      HX ORTHOPAEDIC      back    HX PACEMAKER      INS PPM/ICD LED DUAL ONLY  11/12/2013          No family history of pacer   Social History   Substance Use Topics    Smoking status: Former Smoker     Quit date: 6/4/1972    Smokeless tobacco: Never Used    Alcohol use No        Review of Systems:   Constitutional: Negative for fever, chills, weight loss, malaise/fatigue. HEENT: Negative for nosebleeds, vision changes. Respiratory: Negative for cough, hemoptysis, sputum production, and wheezing. Cardiovascular: Negative for syncope, and PND. Gastrointestinal: Negative for nausea, vomiting, diarrhea, blood in stool and melena. Genitourinary: Negative for dysuria, and hematuria. Musculoskeletal: Negative for myalgias   Skin: Negative for rash. Heme bruise easily. Neurological: Negative for speech change and focal weakness + falls     Objective:     Visit Vitals    /60 (BP 1 Location: Left arm, BP Patient Position: Sitting)    Pulse 69    Resp 16    Ht 5' 10\" (1.778 m)    Wt 180 lb (81.6 kg)    SpO2 98%    BMI 25.83 kg/m2        Physical Exam:   Constitutional: Well-nourished. No distress.    Head: Normocephalic and atraumatic. Eyes: Pupils are equal, round  Neck: supple. No JVD present. Cardiovascular: normal rate, regular rhythm and normal heart sounds. Exam reveals no gallop and no friction rub. No murmur heard. Pulmonary/Chest: Effort normal and diminished bases   Abdominal: Soft, no tenderness. Musculoskeletal: no edema. Neurological: alert,oriented. Skin: Skin is warm and dry unremarkable left sided pacer  Psychiatric: normal mood and affect. Behavior is normal. Judgment and thought content normal.          Assessment/Plan:       ICD-10-CM ICD-9-CM    1. Pacemaker Z95.0 V45.01    2. PAF (paroxysmal atrial fibrillation) ([facility]) I48.0 427.31    3. Sinus node dysfunction ([facility]) I49.5 427.81    4. Atherosclerosis of native coronary artery of native heart without angina pectoris I25.10 414.01    5. Essential hypertension, benign I10 401.1    6. Postsurgical aortocoronary bypass status Z95.1 V45.81    7. Paroxysmal atrial tachycardia  reviewed medications and side effects in detail   He did not appear to have angina   lexican cardiolite stress test 12/2016  No ischemia  LVEF 63%     Device check shows proper functioning of leads and device   20 PAT  14 minutes longest episode  69% RA pacing    No OAC d/t frequent falls. He on ASA. BP low side  He is compensated on exam with no acute s/s of CHF. Echo 2016 shows LVEF 60%. If he has unusual chest pain call me or go to ER again   He does not want to be at [facility] or clinic so does not see Dr Faiza Wade    Follow-up Disposition:  Return in about 6 months (around 1/12/2019). Thank you for involving me in this patient's care and please call with further concerns or questions. Rafia Verduzco M.D.   Electrophysiology/Cardiology  [facility] [place]  [address] , Brennen Cedeño [address] [place] [address]  [phone]

## 2018-10-15 ENCOUNTER — OFFICE VISIT (OUTPATIENT)
Dept: CARDIOLOGY CLINIC | Age: 83
End: 2018-10-15

## 2018-10-15 DIAGNOSIS — Z95.0 CARDIAC PACEMAKER IN SITU: Primary | ICD-10-CM

## 2019-01-17 ENCOUNTER — CLINICAL SUPPORT (OUTPATIENT)
Dept: CARDIOLOGY CLINIC | Age: 84
End: 2019-01-17

## 2019-01-17 ENCOUNTER — OFFICE VISIT (OUTPATIENT)
Dept: CARDIOLOGY CLINIC | Age: 84
End: 2019-01-17

## 2019-01-17 VITALS
WEIGHT: 185 LBS | DIASTOLIC BLOOD PRESSURE: 68 MMHG | HEIGHT: 70 IN | SYSTOLIC BLOOD PRESSURE: 136 MMHG | BODY MASS INDEX: 26.48 KG/M2 | HEART RATE: 74 BPM

## 2019-01-17 DIAGNOSIS — Z95.0 CARDIAC PACEMAKER IN SITU: ICD-10-CM

## 2019-01-17 DIAGNOSIS — I25.10 ATHEROSCLEROSIS OF NATIVE CORONARY ARTERY OF NATIVE HEART WITHOUT ANGINA PECTORIS: ICD-10-CM

## 2019-01-17 DIAGNOSIS — I48.0 PAF (PAROXYSMAL ATRIAL FIBRILLATION) (HCC): ICD-10-CM

## 2019-01-17 DIAGNOSIS — Z95.0 PACEMAKER: Primary | ICD-10-CM

## 2019-01-17 DIAGNOSIS — Z95.0 CARDIAC PACEMAKER IN SITU: Primary | ICD-10-CM

## 2019-01-17 DIAGNOSIS — Z95.1 HX OF CABG: ICD-10-CM

## 2019-01-17 DIAGNOSIS — I49.5 SINUS NODE DYSFUNCTION (HCC): ICD-10-CM

## 2019-01-17 DIAGNOSIS — I10 ESSENTIAL HYPERTENSION, BENIGN: ICD-10-CM

## 2019-01-17 NOTE — PROGRESS NOTES
Cardiac Electrophysiology Office Note     Subjective:      Aury Vargas is a 80 y.o. man who is here for management of his pacemaker and he also sees me for CAD hx of CABG  The patient is here with his wife. He has a good appetite, no weight loss. He has no chest pain or shortness of breath. In the past he had frequent falls, but that seems to be less now. He is hard of hearing. I had seen in 2013 and he was referred by Dr. Valarie Nails for sick sinus syndrome and symptoms of fatigue, sinus bradycardia. The patient has paroxysmal atrial fibrillation on medical therapy, Sotalol. He has not been taking this medication for months and his wife said he does not see Dr Valarie Nails either. He lives near [facility] and had been having pacer checked at [address] [place] [address] although he prefers to see me at [facility] office as it is more convenient to come this way via [address] or [address]  He has diabetes mellitus and prior CABG with Dr Jcarlos Castro years ago  He is taking aspirin     He is followed by Dr Porfirio Blackman, pulmonary associates. Lexiscan stress test 12/2016 normal LVEF and no ischemia. Echo 7/2016 LVEF 60 % to 65 %. No RWMA. Aortic sclerosis. Patient Active Problem List   Diagnosis Code    Coronary atherosclerosis of native coronary artery I25.10    Postsurgical aortocoronary bypass status Z95.1    Essential hypertension, benign I10    Other and unspecified hyperlipidemia E78.5    Type II or unspecified type diabetes mellitus without mention of complication, not stated as uncontrolled E11.9    S/P placement of cardiac pacemaker Z95.0    Sinus node dysfunction (HCC) I49.5    PAF (paroxysmal atrial fibrillation) (HCC) I48.0     Current Outpatient Medications   Medication Sig Dispense Refill    rosuvastatin (CRESTOR) 40 mg tablet Take 1 Tab by mouth daily. 90 Tab 1    furosemide (LASIX) 20 mg tablet Take 20 mg by mouth daily.       triamterene-hydroCHLOROthiazide (MAXZIDE) 37.5-25 mg per tablet Take 1 Tab by mouth daily.  ramipril (ALTACE) 10 mg capsule Take 10 mg by mouth daily.  aspirin delayed-release 81 mg tablet Take  by mouth daily.  pioglitazone (ACTOS) 30 mg tablet Take  by mouth daily. Allergies   Allergen Reactions    Penicillins Rash     Past Medical History:   Diagnosis Date    At[facility] fibrillation ([facility]     CAD (coronary artery disease)     Cataracts, bilateral     Coronary atherosclerosis of native coronary artery     Essential hypertension, benign     Other and unspecified hyperlipidemia     PAF (paroxysmal atrial fibrillation) ([facility] 2016    Postsurgical aortocoronary bypass status     Pre-operative cardiovascular examination     Type II or unspecified type diabetes mellitus without mention of complication, not stated as uncontrolled      Past Surgical History:   Procedure Laterality Date    CARDIAC SURG PROCEDURE UNLIST      bypass    HX CHOLECYSTECTOMY      HX ORTHOPAEDIC      back    HX PACEMAKER      INS PPM/ICD LED DUAL ONLY  2013          No family history of pacer   Social History     Tobacco Use    Smoking status: Former Smoker     Last attempt to quit: 1972     Years since quittin.6    Smokeless tobacco: Never Used   Substance Use Topics    Alcohol use: No        Review of Systems:   Constitutional: Negative for fever, chills, weight loss, malaise/fatigue. HEENT: Negative for nosebleeds, vision changes. Respiratory: Negative for cough, hemoptysis, sputum production, and wheezing. Cardiovascular: Negative for syncope, and PND. Gastrointestinal: Negative for nausea, vomiting, diarrhea, blood in stool and melena. Genitourinary: Negative for dysuria, and hematuria. Musculoskeletal: Negative for myalgias   Skin: Negative for rash. Heme bruise easily.    Neurological: Negative for speech change and focal weakness + falls     Objective:     Visit Vitals  /68   Pulse 74   Ht 5' 10\" (1.778 m)   Wt 185 lb (83.9 kg)   BMI 26.54 kg/m²        Physical Exam:   Constitutional: Well-nourished. No distress. Head: Normocephalic and atraumatic. Eyes: Pupils are equal, round  Neck: supple. No JVD present. Cardiovascular: normal rate, regular rhythm and normal heart sounds. Exam reveals no gallop and no friction rub. 2/6 systolic murmur heard. Pulmonary/Chest: Effort normal and diminished bases   Abdominal: Soft, no tenderness. Musculoskeletal: no edema. Neurological: alert,oriented. Skin: Skin is warm and dry unremarkable left sided pacer  Psychiatric: normal mood and affect. Behavior is normal. Judgment and thought content normal.          Assessment/Plan:       ICD-10-CM ICD-9-CM    1. Pacemaker Z95.0 V45.01    2. Cardiac pacemaker in situ Z95.0 V45.01    3. PAF (paroxysmal atrial fibrillation) ([facility]) I48.0 427.31    4. Sinus node dysfunction ([facility]) I49.5 427.81    5. Essential hypertension, benign I10 401.1    6. Atherosclerosis of native coronary artery of native heart without angina pectoris I25.10 414.01    7. Hx of CABG Z95.1 V45.81         reviewed medications and side effects in detail   He did not appear to have angina   lexican cardiolite stress test 12/2016  No ischemia  LVEF 63%     Device check shows proper functioning of leads and device   20 PAT/atrial flutter  14 minutes longest episode  69% RA pacing    No OAC d/t frequent falls in the past. He on ASA. BP low side  He is compensated on exam with no acute s/s of CHF. Echo 2016 shows LVEF 60%.        Future Appointments   Date Time Provider Renetta Mcbride   4/24/2019  1:00 PM REMOTE1, [address]   5/6/2019 10:15 AM REMOTE1, [address]   8/5/2019 11:00 AM REMOTE1, MARTINA MORGAN SCHED   11/11/2019  8:00 AM REMOTE1, [address]   2/6/2020 11:00 AM PACEMAKER3, [address]   2/6/2020 11:20 AM Russell Roberts  [address]       Thank you for involving me in this patient's care and please call with further concerns or questions. Shayla Epstein M.D.   Electrophysiology/Cardiology  [facility] [place]  [facility] [address], [address] , [address]  [phone]

## 2019-02-02 ENCOUNTER — TELEPHONE (OUTPATIENT)
Dept: CARDIOLOGY CLINIC | Age: 84
End: 2019-02-02

## 2019-02-02 NOTE — TELEPHONE ENCOUNTER
Severe chest pain this morning, toldl to come to er if recurs.    Neg ER evaluation yesterday at [facility].

## 2019-02-03 ENCOUNTER — HOSPITAL ENCOUNTER (EMERGENCY)
Age: 84
Discharge: HOME | End: 2019-02-03
Attending: EMERGENCY MEDICINE
Payer: MEDICARE

## 2019-02-03 ENCOUNTER — APPOINTMENT (OUTPATIENT)
Dept: GENERAL RADIOLOGY | Age: 84
End: 2019-02-03
Attending: EMERGENCY MEDICINE
Payer: MEDICARE

## 2019-02-03 VITALS
HEIGHT: 70 IN | TEMPERATURE: 97.7 F | OXYGEN SATURATION: 98 % | DIASTOLIC BLOOD PRESSURE: 66 MMHG | HEART RATE: 66 BPM | RESPIRATION RATE: 18 BRPM | BODY MASS INDEX: 25.77 KG/M2 | SYSTOLIC BLOOD PRESSURE: 141 MMHG | WEIGHT: 180 LBS

## 2019-02-03 DIAGNOSIS — B02.9 HERPES ZOSTER WITHOUT COMPLICATION: Primary | ICD-10-CM

## 2019-02-03 LAB
ALBUMIN SERPL-MCNC: 3.9 G/DL (ref 3.5–5)
ALBUMIN/GLOB SERPL: 0.9 {RATIO} (ref 1.1–2.2)
ALP SERPL-CCNC: 81 U/L (ref 45–117)
ALT SERPL-CCNC: 30 U/L (ref 12–78)
ANION GAP SERPL CALC-SCNC: 5 MMOL/L (ref 5–15)
AST SERPL-CCNC: 31 U/L (ref 15–37)
ATRIAL RATE: 83 BPM
BASOPHILS # BLD: 0 K/UL (ref 0–0.1)
BASOPHILS NFR BLD: 0 % (ref 0–1)
BILIRUB SERPL-MCNC: 0.6 MG/DL (ref 0.2–1)
BUN SERPL-MCNC: 34 MG/DL (ref 6–20)
BUN/CREAT SERPL: 21 (ref 12–20)
CALCIUM SERPL-MCNC: 9 MG/DL (ref 8.5–10.1)
CALCULATED P AXIS, ECG09: 43 DEGREES
CALCULATED R AXIS, ECG10: 10 DEGREES
CALCULATED T AXIS, ECG11: 29 DEGREES
CHLORIDE SERPL-SCNC: 100 MMOL/L (ref 97–108)
CK MB CFR SERPL CALC: 2 % (ref 0–2.5)
CK MB SERPL-MCNC: 2.1 NG/ML (ref 5–25)
CK SERPL-CCNC: 103 U/L (ref 39–308)
CO2 SERPL-SCNC: 31 MMOL/L (ref 21–32)
COMMENT, HOLDF: NORMAL
CREAT SERPL-MCNC: 1.61 MG/DL (ref 0.7–1.3)
DIAGNOSIS, 93000: NORMAL
DIFFERENTIAL METHOD BLD: NORMAL
EOSINOPHIL # BLD: 0.1 K/UL (ref 0–0.4)
EOSINOPHIL NFR BLD: 2 % (ref 0–7)
ERYTHROCYTE [DISTWIDTH] IN BLOOD BY AUTOMATED COUNT: 13.9 % (ref 11.5–14.5)
GLOBULIN SER CALC-MCNC: 4.3 G/DL (ref 2–4)
GLUCOSE SERPL-MCNC: 171 MG/DL (ref 65–100)
HCT VFR BLD AUTO: 40.2 % (ref 36.6–50.3)
HGB BLD-MCNC: 12.6 G/DL (ref 12.1–17)
IMM GRANULOCYTES # BLD AUTO: 0 K/UL (ref 0–0.04)
IMM GRANULOCYTES NFR BLD AUTO: 0 % (ref 0–0.5)
LYMPHOCYTES # BLD: 0.8 K/UL (ref 0.8–3.5)
LYMPHOCYTES NFR BLD: 18 % (ref 12–49)
MCH RBC QN AUTO: 30.1 PG (ref 26–34)
MCHC RBC AUTO-ENTMCNC: 31.3 G/DL (ref 30–36.5)
MCV RBC AUTO: 95.9 FL (ref 80–99)
MONOCYTES # BLD: 0.5 K/UL (ref 0–1)
MONOCYTES NFR BLD: 11 % (ref 5–13)
NEUTS SEG # BLD: 3.1 K/UL (ref 1.8–8)
NEUTS SEG NFR BLD: 69 % (ref 32–75)
NRBC # BLD: 0 K/UL (ref 0–0.01)
NRBC BLD-RTO: 0 PER 100 WBC
P-R INTERVAL, ECG05: 246 MS
PLATELET # BLD AUTO: 210 K/UL (ref 150–400)
PMV BLD AUTO: 9.9 FL (ref 8.9–12.9)
POTASSIUM SERPL-SCNC: 3.6 MMOL/L (ref 3.5–5.1)
PROT SERPL-MCNC: 8.2 G/DL (ref 6.4–8.2)
Q-T INTERVAL, ECG07: 370 MS
QRS DURATION, ECG06: 94 MS
QTC CALCULATION (BEZET), ECG08: 434 MS
RBC # BLD AUTO: 4.19 M/UL (ref 4.1–5.7)
SAMPLES BEING HELD,HOLD: NORMAL
SODIUM SERPL-SCNC: 136 MMOL/L (ref 136–145)
TROPONIN I SERPL-MCNC: <0.05 NG/ML
VENTRICULAR RATE, ECG03: 83 BPM
WBC # BLD AUTO: 4.6 K/UL (ref 4.1–11.1)

## 2019-02-03 PROCEDURE — 74011250637 HC RX REV CODE- 250/637: Performed by: EMERGENCY MEDICINE

## 2019-02-03 PROCEDURE — 93005 ELECTROCARDIOGRAM TRACING: CPT

## 2019-02-03 PROCEDURE — 99284 EMERGENCY DEPT VISIT MOD MDM: CPT

## 2019-02-03 PROCEDURE — 85025 COMPLETE CBC W/AUTO DIFF WBC: CPT

## 2019-02-03 PROCEDURE — 80053 COMPREHEN METABOLIC PANEL: CPT

## 2019-02-03 PROCEDURE — 84484 ASSAY OF TROPONIN QUANT: CPT

## 2019-02-03 PROCEDURE — 82550 ASSAY OF CK (CPK): CPT

## 2019-02-03 RX ORDER — VALACYCLOVIR HYDROCHLORIDE 1 G/1
1000 TABLET, FILM COATED ORAL 3 TIMES DAILY
Qty: 21 TAB | Refills: 0 | Status: SHIPPED | OUTPATIENT
Start: 2019-02-03 | End: 2019-02-10

## 2019-02-03 RX ORDER — TRAMADOL HYDROCHLORIDE 50 MG/1
50 TABLET ORAL
Qty: 20 TAB | Refills: 0 | Status: SHIPPED | OUTPATIENT
Start: 2019-02-03

## 2019-02-03 RX ORDER — TRAMADOL HYDROCHLORIDE 50 MG/1
50 TABLET ORAL
Status: COMPLETED | OUTPATIENT
Start: 2019-02-03 | End: 2019-02-03

## 2019-02-03 RX ADMIN — TRAMADOL HYDROCHLORIDE 50 MG: 50 TABLET, FILM COATED ORAL at 15:44

## 2019-02-03 NOTE — ED TRIAGE NOTES
Patient arrives with wife with c/o RIGHT chest pain since Friday, was seen Friday at [address] [place] [address].  Denies n/v, denies SOB. Wife called Dr Josefina Arreguin today, Dr Greta Augustine is patient's cardiologist, she stated if worsened to come here. Intermittent in nature. Denies cough/fever

## 2019-02-03 NOTE — DISCHARGE INSTRUCTIONS
Continue your routine medications after discharge from the Emergency Department  Take new medicines as directed      Patient Education        Shingles: Care Instructions  Your Care Instructions    Shingles (herpes zoster) causes pain and a blistered rash. The rash can appear anywhere on the body but will be on only one side of the body, the left or right. It will be in a band, a strip, or a small area. The pain can be very severe. Shingles can also cause tingling or itching in the area of the rash. The blisters scab over after a few days and heal in 2 to 4 weeks. Medicines can help you feel better and may help prevent more serious problems caused by shingles. Shingles is caused by the same virus that causes chickenpox. When you have chickenpox, the virus gets into your nerve roots and stays there (becomes dormant) long after you get over the chickenpox. If the virus becomes active again, it can cause shingles. Follow-up care is a key part of your treatment and safety. Be sure to make and go to all appointments, and call your doctor if you are having problems. It's also a good idea to know your test results and keep a list of the medicines you take. How can you care for yourself at home? · Be safe with medicines. Take your medicines exactly as prescribed. Call your doctor if you think you are having a problem with your medicine. Antiviral medicine helps you get better faster. · Try not to scratch or pick at the blisters. They will crust over and fall off on their own if you leave them alone. · Put cool, wet cloths on the area to relieve pain and itching. You can also use calamine lotion. Try not to use so much lotion that it cakes and is hard to get off. · Put cornstarch or baking soda on the sores to help dry them out so they heal faster. · Do not use thick ointment, such as petroleum jelly, on the sores. This will keep them from drying and healing. · To help remove loose crusts, soak them in tap water. This can help decrease oozing, and dry and soothe the skin. · Take an over-the-counter pain medicine, such as acetaminophen (Tylenol), ibuprofen (Advil, Motrin), or naproxen (Aleve). Read and follow all instructions on the label. · Avoid close contact with people until the blisters have healed. It is very important for you to avoid contact with anyone who has never had chickenpox or the chickenpox vaccine. Pregnant women, young babies, and anyone else who has a hard time fighting infection (such as someone with HIV, diabetes, or cancer) is especially at risk. When should you call for help? Call your doctor now or seek immediate medical care if:    · You have a new or higher fever.     · You have a severe headache and a stiff neck.     · You lose the ability to think clearly.     · The rash spreads to your forehead, nose, eyes, or eyelids.     · You have eye pain, or your vision gets worse.     · You have new pain in your face, or you cannot move the muscles in your face.     · Blisters spread to new parts of your body.    Watch closely for changes in your health, and be sure to contact your doctor if:    · The rash has not healed after 2 to 4 weeks.     · You still have pain after the rash has healed. Where can you learn more? Go to http://vimal-tucker.info/. Jennifer Seek in the search box to learn more about \"Shingles: Care Instructions. \"  Current as of: July 30, 2018  Content Version: 11.9  © 9758-6566 [employer]. Care instructions adapted under license by [employer] (which disclaims liability or warranty for this information). If you have questions about a medical condition or this instruction, always ask your healthcare professional. [address] any warranty or liability for your use of this information.

## 2019-02-03 NOTE — ED PROVIDER NOTES
80 y.o. male with past medical history significant for CAD, HTN, HLD, DM-II, Afib, PAF, s/p CABG, s/p pacemaker placement presents accompanied by wife with chief complaint of intermittent superficial right-sided chest pain for the last 3 days. Pt and wife explain that the pt was seen at [facility] with a normal workup (labs, UA, EKG). Pt's spouse further explains that the pain became severe; pt spouse called the on-call cardiologist (Dr. Samia Kemp) who advised coming to the ED if it worsened. Today, the pt's pain extended to his RUE and right upper back, prompting ED arrival today. Pt denies any nausea, vomiting, shortness of breath, cough, or fevers at this time. There are no other acute medical concerns at this time. Social hx: Patient denies current Tobacco use; quit in 1972. Denies EtOH use. Denies illicit drug abuse. PCP: Vadim Ferguson PA-C Note written by Tata Hobson, as dictated by Priscilla Marion MD 2:18 PM 
 
 
 
The history is provided by the patient. No  was used. Past Medical History:  
Diagnosis Date  Atrial fibrillation ([address]  CAD (coronary artery disease)  Cataracts, bilateral   
 Coronary atherosclerosis of native coronary artery  Essential hypertension, benign  Other and unspecified hyperlipidemia  PAF (paroxysmal atrial fibrillation) ([address] 6/23/2016  Postsurgical aortocoronary bypass status  Pre-operative cardiovascular examination  Type II or unspecified type diabetes mellitus without mention of complication, not stated as uncontrolled Past Surgical History:  
Procedure Laterality Date  CARDIAC SURG PROCEDURE UNLIST    
 bypass  HX CHOLECYSTECTOMY  HX ORTHOPAEDIC    
 back  HX PACEMAKER    
 INS PPM/ICD LED DUAL ONLY  11/12/2013 History reviewed. No pertinent family history. Social History Socioeconomic History  Marital status:  Spouse name: Not on file  Number of children: Not on file  Years of education: Not on file  Highest education level: Not on file Social Needs  Financial resource strain: Not on file  Food insecurity - worry: Not on file  Food insecurity - inability: Not on file  Transportation needs - medical: Not on file  Transportation needs - non-medical: Not on file Occupational History  Not on file Tobacco Use  Smoking status: Former Smoker Last attempt to quit: 1972 Years since quittin.6  Smokeless tobacco: Never Used Substance and Sexual Activity  Alcohol use: No  
 Drug use: No  
 Sexual activity: Not on file Other Topics Concern  Not on file Social History Narrative  Not on file ALLERGIES: Penicillins Review of Systems Constitutional: Negative for chills, diaphoresis and fever. HENT: Negative for congestion, postnasal drip, rhinorrhea and sore throat. Eyes: Negative for photophobia, discharge, redness and visual disturbance. Respiratory: Negative for cough, chest tightness, shortness of breath and wheezing. Cardiovascular: Negative for chest pain, palpitations and leg swelling. Gastrointestinal: Negative for abdominal distention, abdominal pain, blood in stool, constipation, diarrhea, nausea and vomiting. Genitourinary: Negative for difficulty urinating, dysuria, frequency, hematuria and urgency. Musculoskeletal: Negative for arthralgias, back pain, joint swelling and myalgias. Skin: Positive for rash (painful across back, chest, and RUE). Negative for color change. Neurological: Negative for dizziness, speech difficulty, weakness, light-headedness, numbness and headaches. Psychiatric/Behavioral: Negative for confusion. The patient is not nervous/anxious. All other systems reviewed and are negative. Vitals:  
 19 1226 19 1310 BP:  124/64 Pulse: (!) 120 72 Resp:  18 Temp:  98.1 °F (36.7 °C) SpO2: 95% 96% Weight:  81.6 kg (180 lb) Height:  5' 10\" (1.778 m) Physical Exam  
Constitutional: He is oriented to person, place, and time. He appears well-developed and well-nourished. No distress. HENT:  
Head: Normocephalic and atraumatic. Right Ear: External ear normal.  
Left Ear: External ear normal.  
Nose: Nose normal.  
Mouth/Throat: Oropharynx is clear and moist.  
Eyes: Conjunctivae and EOM are normal. Pupils are equal, round, and reactive to light. No scleral icterus. Neck: Normal range of motion. Neck supple. No JVD present. No tracheal deviation present. No thyromegaly present. Cardiovascular: Normal rate, regular rhythm and normal heart sounds. Exam reveals no gallop and no friction rub. No murmur heard. Pulmonary/Chest: Effort normal and breath sounds normal. No respiratory distress. He has no wheezes. He has no rales. He exhibits no tenderness. Abdominal: Soft. Bowel sounds are normal. He exhibits no distension and no mass. There is no tenderness. There is no rebound and no guarding. Musculoskeletal: Normal range of motion. He exhibits no edema or tenderness. Lymphadenopathy:  
  He has no cervical adenopathy. Neurological: He is alert and oriented to person, place, and time. He has normal strength. He displays no atrophy and no tremor. No cranial nerve deficit. He exhibits normal muscle tone. Coordination and gait normal.  
Skin: Skin is warm and dry. He is not diaphoretic. See attached photo. Psychiatric: He has a normal mood and affect. His behavior is normal. Judgment and thought content normal.  
Nursing note and vitals reviewed. Note written by Emeterio Brewster, as dictated by Erika Gray MD 2:18 PM  
 
MDM Number of Diagnoses or Management Options Herpes zoster without complication:  
Diagnosis management comments: Mello Sparrow Impression: 43-year-old male presenting to the emergency department with right-sided chest pain which radiates into his axilla and now into his back. On examination the patient has a vesicular dermatitis is consistent with herpes zoster in a dermatomal distribution. Plan of care will be treated with antiviral agents analgesic medication and follow up with his PCP. Procedures 2:54 PM 
The patient has been updated on results, and has been planned for discharge. Instructed the patient to follow-up with PCP, and to return to the ED should any new symptoms arise or worsen. Patient agreeable to plan. 2:55 PM 
Patient's results have been reviewed with them. Patient and/or family have verbally conveyed their understanding and agreement of the patient's signs, symptoms, diagnosis, treatment and prognosis and additionally agree to follow up as recommended or return to the Emergency Room should their condition change prior to follow-up. Discharge instructions have also been provided to the patient with some educational information regarding their diagnosis as well a list of reasons why they would want to return to the ER prior to their follow-up appointment should their condition change.

## 2019-02-04 NOTE — CALL BACK NOTE
[facility] Emergency Department Follow Up Call Record Discharged to : Home/Family Home/Home Health/Skilled Facility/Rehab/Assisted Living/Other_Home______ 1) Did you receive your discharge instructions? Yes       
2) Do you understand them? Yes        
3) Are you able to follow them? Yes If NO, what can I clarify for you? 4) Do you understand your diagnosis? Yes        
5) Do you know which symptoms should prompt you to call the doctor? Yes    
6) Were you able to fill and  any medications that were prescribed? Yes  
 
7) You were prescribed _tramadol, valcyclovir__________for ___shingles_________________. Common side effects of this medication are___drowsiness, rash , mild nausea._________________. This is not a complete list so please review the forms given from the pharmacy for a complete list. 8) Are there any questions about your medications? No     
 
  
 Have you scheduled any recommended doctors appointments (specialty, PCP) Mrs. Michael Ayala will be following up with [facility] PCP to make appointment to be seen. If NO, what barriers are you encountering (transportation/lost contact info/cost/ 
didnt think necessary/no PCP 
9) If discharged with Home Health, has the agency contacted you to schedule visit? Not applicable 10) Is there anyone available to help you at home (meals, errands, transportation   
monitoring) (adult children, neighbors, private duty companions) Yes   
11) Are you on a special diet? No        
If YES, do you understand the requirements for this diet? Education provided? 12) If presented with cough, bronchitis, COPD, asthma, is it ok to ask that the 
 respiratory disease management educator call you? Not applicable 13)  A) If presented with fall, were you issued an assistive device in the ED Are you using? Yes 
B) If given RX for device, have you obtained? Not applicable If NO, barriers?  
C) Therapist recommended: NO 
 Are you able to implement the suggestions? Not applicable If NO, barriers to implementation? D) Are you having any difficulties with mobility inside your home?   
 (steps, bed, tub)No 
 If YES, ask if the [facility] PT can contact patient and good time and number? 
14)  At the end of your discharge instructions, there is information about accessing [facility], have you had a chance to review those? Yes Do you have any questions about signing up for this service? NO We encourage our patients to be active participants in their healthcare and this site is one of the ways to do that. It will allow you to access parts of your medical record, email your doctors office, schedule appointments, and request medications refills . 15) Are there any other questions that I can answer for you regarding  
 your Emergency department visit? NO Estimated Call Time:___12:35 PM 
________________ Date/Time:_______________

## 2019-04-09 ENCOUNTER — TELEPHONE (OUTPATIENT)
Dept: CARDIOLOGY CLINIC | Age: 84
End: 2019-04-09

## 2019-04-09 NOTE — TELEPHONE ENCOUNTER
Linn with [facility] is requesting information regarding the patient's pacemaker and instructions prior to an MRI. Please advise.     Phone #: [phone]  Thanks

## 2019-04-11 NOTE — TELEPHONE ENCOUNTER
The number is correct; no Linn there. Transferred to 2 different numbers, last one disconnected; placed on hold. 2nd call placed; spoke to [facility]). There was no order placed in system; will send message to his nurse, Eliceo Frank (how her name sounded) requesting a call back only if an MRI is to be done. Pending call, MRI clearance by Dr. Sheila Levin will be completed.

## 2019-08-05 ENCOUNTER — OFFICE VISIT (OUTPATIENT)
Dept: CARDIOLOGY CLINIC | Age: 84
End: 2019-08-05

## 2019-08-05 DIAGNOSIS — Z95.0 CARDIAC PACEMAKER IN SITU: Primary | ICD-10-CM

## 2019-09-14 ENCOUNTER — HOSPITAL ENCOUNTER (EMERGENCY)
Age: 84
Discharge: HOME | End: 2019-09-14
Attending: EMERGENCY MEDICINE
Payer: MEDICARE

## 2019-09-14 ENCOUNTER — APPOINTMENT (OUTPATIENT)
Dept: GENERAL RADIOLOGY | Age: 84
End: 2019-09-14
Attending: EMERGENCY MEDICINE
Payer: MEDICARE

## 2019-09-14 VITALS
HEART RATE: 69 BPM | RESPIRATION RATE: 20 BRPM | DIASTOLIC BLOOD PRESSURE: 41 MMHG | HEIGHT: 71 IN | SYSTOLIC BLOOD PRESSURE: 135 MMHG | WEIGHT: 165 LBS | TEMPERATURE: 97 F | BODY MASS INDEX: 23.1 KG/M2 | OXYGEN SATURATION: 97 %

## 2019-09-14 DIAGNOSIS — I95.2 HYPOTENSION DUE TO DRUGS: Primary | ICD-10-CM

## 2019-09-14 LAB
ANION GAP SERPL CALC-SCNC: 7 MMOL/L (ref 5–15)
APPEARANCE UR: ABNORMAL
BACTERIA URNS QL MICRO: NEGATIVE /HPF
BASOPHILS # BLD: 0.1 K/UL (ref 0–0.1)
BASOPHILS NFR BLD: 1 % (ref 0–1)
BILIRUB UR QL: NEGATIVE
BNP SERPL-MCNC: 239 PG/ML
BUN SERPL-MCNC: 41 MG/DL (ref 6–20)
BUN/CREAT SERPL: 25 (ref 12–20)
CALCIUM SERPL-MCNC: 9 MG/DL (ref 8.5–10.1)
CHLORIDE SERPL-SCNC: 97 MMOL/L (ref 97–108)
CO2 SERPL-SCNC: 30 MMOL/L (ref 21–32)
COLOR UR: ABNORMAL
COMMENT, HOLDF: NORMAL
CREAT SERPL-MCNC: 1.67 MG/DL (ref 0.7–1.3)
DIFFERENTIAL METHOD BLD: ABNORMAL
EOSINOPHIL # BLD: 0.1 K/UL (ref 0–0.4)
EOSINOPHIL NFR BLD: 1 % (ref 0–7)
EPITH CASTS URNS QL MICRO: ABNORMAL /LPF
ERYTHROCYTE [DISTWIDTH] IN BLOOD BY AUTOMATED COUNT: 13.8 % (ref 11.5–14.5)
GLUCOSE SERPL-MCNC: 180 MG/DL (ref 65–100)
GLUCOSE UR STRIP.AUTO-MCNC: NEGATIVE MG/DL
HCT VFR BLD AUTO: 37 % (ref 36.6–50.3)
HGB BLD-MCNC: 12 G/DL (ref 12.1–17)
HGB UR QL STRIP: ABNORMAL
HYALINE CASTS URNS QL MICRO: ABNORMAL /LPF (ref 0–5)
IMM GRANULOCYTES # BLD AUTO: 0 K/UL (ref 0–0.04)
IMM GRANULOCYTES NFR BLD AUTO: 0 % (ref 0–0.5)
KETONES UR QL STRIP.AUTO: NEGATIVE MG/DL
LEUKOCYTE ESTERASE UR QL STRIP.AUTO: NEGATIVE
LYMPHOCYTES # BLD: 0.6 K/UL (ref 0.8–3.5)
LYMPHOCYTES NFR BLD: 12 % (ref 12–49)
MAGNESIUM SERPL-MCNC: 2.7 MG/DL (ref 1.6–2.4)
MCH RBC QN AUTO: 29.4 PG (ref 26–34)
MCHC RBC AUTO-ENTMCNC: 32.4 G/DL (ref 30–36.5)
MCV RBC AUTO: 90.7 FL (ref 80–99)
MONOCYTES # BLD: 0.5 K/UL (ref 0–1)
MONOCYTES NFR BLD: 10 % (ref 5–13)
NEUTS SEG # BLD: 4 K/UL (ref 1.8–8)
NEUTS SEG NFR BLD: 76 % (ref 32–75)
NITRITE UR QL STRIP.AUTO: NEGATIVE
NRBC # BLD: 0 K/UL (ref 0–0.01)
NRBC BLD-RTO: 0 PER 100 WBC
PH UR STRIP: 5 [PH] (ref 5–8)
PLATELET # BLD AUTO: 206 K/UL (ref 150–400)
PMV BLD AUTO: 10.3 FL (ref 8.9–12.9)
POTASSIUM SERPL-SCNC: 3.9 MMOL/L (ref 3.5–5.1)
PROT UR STRIP-MCNC: ABNORMAL MG/DL
RBC # BLD AUTO: 4.08 M/UL (ref 4.1–5.7)
RBC #/AREA URNS HPF: ABNORMAL /HPF (ref 0–5)
RBC MORPH BLD: ABNORMAL
SAMPLES BEING HELD,HOLD: NORMAL
SODIUM SERPL-SCNC: 134 MMOL/L (ref 136–145)
SP GR UR REFRACTOMETRY: 1.01 (ref 1–1.03)
TROPONIN I SERPL-MCNC: <0.05 NG/ML
UR CULT HOLD, URHOLD: NORMAL
UROBILINOGEN UR QL STRIP.AUTO: 0.2 EU/DL (ref 0.2–1)
WBC # BLD AUTO: 5.3 K/UL (ref 4.1–11.1)
WBC URNS QL MICRO: ABNORMAL /HPF (ref 0–4)

## 2019-09-14 PROCEDURE — 83735 ASSAY OF MAGNESIUM: CPT

## 2019-09-14 PROCEDURE — 36415 COLL VENOUS BLD VENIPUNCTURE: CPT

## 2019-09-14 PROCEDURE — 74011250636 HC RX REV CODE- 250/636: Performed by: EMERGENCY MEDICINE

## 2019-09-14 PROCEDURE — 93005 ELECTROCARDIOGRAM TRACING: CPT

## 2019-09-14 PROCEDURE — 80048 BASIC METABOLIC PNL TOTAL CA: CPT

## 2019-09-14 PROCEDURE — 84484 ASSAY OF TROPONIN QUANT: CPT

## 2019-09-14 PROCEDURE — 99285 EMERGENCY DEPT VISIT HI MDM: CPT

## 2019-09-14 PROCEDURE — 85025 COMPLETE CBC W/AUTO DIFF WBC: CPT

## 2019-09-14 PROCEDURE — 83880 ASSAY OF NATRIURETIC PEPTIDE: CPT

## 2019-09-14 PROCEDURE — 81001 URINALYSIS AUTO W/SCOPE: CPT

## 2019-09-14 PROCEDURE — 71045 X-RAY EXAM CHEST 1 VIEW: CPT

## 2019-09-14 RX ADMIN — SODIUM CHLORIDE 500 ML: 900 INJECTION, SOLUTION INTRAVENOUS at 17:03

## 2019-09-14 NOTE — DISCHARGE INSTRUCTIONS
Patient Education        Low Blood Pressure: Care Instructions  Your Care Instructions    Blood pressure is a measurement of the force of the blood against the walls of the blood vessels during and after each beat of the heart. Low blood pressure is also called hypotension. It means that your blood pressure is much lower than normal. Some people, especially young, slim women, may have slightly low blood pressure without symptoms. But in many people, low blood pressure can cause symptoms such as feeling dizzy or lightheaded. When your blood pressure is too low, your heart, brain, and other organs do not get enough blood. Low blood pressure can be caused by many things, including heart problems and some medicines. Diabetes that is not under control can cause your blood pressure to drop. And so can a severe allergic reaction or infection. Another cause is dehydration, which is when your body loses too much fluid. Treatment for low blood pressure depends on the cause. Follow-up care is a key part of your treatment and safety. Be sure to make and go to all appointments, and call your doctor if you are having problems. It's also a good idea to know your test results and keep a list of the medicines you take. How can you care for yourself at home? · Drink plenty of fluids, enough so that your urine is light yellow or clear like water. If you have kidney, heart, or liver disease and have to limit fluids, talk with your doctor before you increase the amount of fluids you drink. · Be safe with medicines. Call your doctor if you think you are having a problem with your medicine. You will get more details on the specific medicines your doctor prescribes. · Stand up or get out of bed very slowly to allow your body to adjust.  · Get plenty of rest.  · Do not smoke. Smoking increases your risk of heart attack. If you need help quitting, talk to your doctor about stop-smoking programs and medicines.  These can increase your chances of quitting for good. · Limit alcohol to 2 drinks a day for men and 1 drink a day for women. Alcohol may interfere with your medicine. In addition, alcohol can make your low blood pressure worse by causing your body to lose water. When should you call for help? Call 911 anytime you think you may need emergency care. For example, call if:    · You passed out (lost consciousness).    Call your doctor now or seek immediate medical care if:    · You are dizzy or lightheaded, or you feel like you may faint.    Watch closely for changes in your health, and be sure to contact your doctor if you have any problems. Where can you learn more? Go to http://vimal-tucker.info/. Enter C304 in the search box to learn more about \"Low Blood Pressure: Care Instructions. \"  Current as of: July 22, 2018  Content Version: 12.1  © 8087-4349 [employer]. Care instructions adapted under license by [employer] (which disclaims liability or warranty for this information). If you have questions about a medical condition or this instruction, always ask your healthcare professional. [address] any warranty or liability for your use of this information.

## 2019-09-14 NOTE — ED NOTES
MD and RN reviewed discharge instructions and options with patient; patient verbalized understanding. Pt. Ambulated to exit without difficulty and in no signs of acute distress. Patient will follow up as discussed.

## 2019-09-14 NOTE — ED TRIAGE NOTES
ER visit at [facility] on Thursday for lethargy x 3 days, was treated for electrolyte imbalance and dehydration and discharged home. Home health nurse came to visit today and was found to have BP readings 60's/40's x 4 readings. Per wife, patient has dementia and has been a little more spunky, not sleeping as much. He had his blood pressure medications this morning including triamterene.

## 2019-09-14 NOTE — ED PROVIDER NOTES
80 y.o. male with past medical history significant for dementia, Coronary atherosclerosis, DM, essential HTN, A Fib, CAD who presents via car to the ED with cc of hypotension. Per wife, patient was seen at [address] [place] [address] for lethargy, low potassium, dehydration, given fluids and discharged with medications. Wife states that patient was seen by home health nurse today, who found that patient had a blood pressure of 60/40 and referred patient to the ED. Wife states that she has difficulty getting the patient to drink fluids, and that he has been in and out of the hospital since March 2019. Wife states that patient was in a facility for Dementia and was taken out of the facility for the past three weeks. Wife notes that patient was prescribed Trazodone and Risperidone. Patient is overall poor historian and most of history was obtained from wife. Patient denies fever, shortness of breath, diarrhea, cough, dysuria, sore throat, headache. Full history, physical exam, and ROS unable to be obtained due to:  Patient is overall poor historian. Per chart, patient had seen Dr Amita Brady in July and had a blood pressure of 173 systolic. Patient's last echocardiogram was in 2016, which found a normal EF. Social Hx: former Tobacco use, no EtOH use, no Illicit Drug use  PCP: Marci Morgan PA-C    Note written by Ly John, as dictated by Kelsie Fitzpatrick MD 4:11 PM      The history is provided by the patient and the spouse. History limited by: Patient is poor historian, has dementia.         Past Medical History:   Diagnosis Date    Atrial fibrillation ([address]     CAD (coronary artery disease)     Cataracts, bilateral     Coronary atherosclerosis of native coronary artery     Essential hypertension, benign     Other and unspecified hyperlipidemia     PAF (paroxysmal atrial fibrillation) ([address] 6/23/2016    Postsurgical aortocoronary bypass status     Pre-operative cardiovascular examination     Type II or unspecified type diabetes mellitus without mention of complication, not stated as uncontrolled        Past Surgical History:   Procedure Laterality Date    CARDIAC SURG PROCEDURE UNLIST      bypass    HX CHOLECYSTECTOMY      HX ORTHOPAEDIC      back    HX PACEMAKER      INS PPM/ICD LED DUAL ONLY  2013              No family history on file.     Social History     Socioeconomic History    Marital status:      Spouse name: Not on file    Number of children: Not on file    Years of education: Not on file    Highest education level: Not on file   Occupational History    Not on file   Social Needs    Financial resource strain: Not on file    Food insecurity:     Worry: Not on file     Inability: Not on file    Transportation needs:     Medical: Not on file     Non-medical: Not on file   Tobacco Use    Smoking status: Former Smoker     Last attempt to quit: 1972     Years since quittin.3    Smokeless tobacco: Never Used   Substance and Sexual Activity    Alcohol use: No    Drug use: No    Sexual activity: Not on file   Lifestyle    Physical activity:     Days per week: Not on file     Minutes per session: Not on file    Stress: Not on file   Relationships    Social connections:     Talks on phone: Not on file     Gets together: Not on file     Attends [employer] service: Not on file     Active member of club or organization: Not on file     Attends meetings of clubs or organizations: Not on file     Relationship status: Not on file    Intimate partner violence:     Fear of current or ex partner: Not on file     Emotionally abused: Not on file     Physically abused: Not on file     Forced sexual activity: Not on file   Other Topics Concern    Not on file   Social History Narrative    Not on file         ALLERGIES: Penicillins    Review of Systems   Unable to perform ROS: Dementia       Vitals:    19 1606   BP: (!) 84/47   Pulse: 74   Resp: 18   Temp: 98 °F (36.7 °C)   SpO2: 95% Weight: 74.8 kg (165 lb)   Height: 5' 10.5\" (1.791 m)            Physical Exam   Constitutional: He appears well-developed and well-nourished. HENT:   Head: Normocephalic and atraumatic. Eyes: Conjunctivae are normal.   Neck: Normal range of motion. Cardiovascular: Normal rate, regular rhythm, normal heart sounds and intact distal pulses. No murmur heard. Pulmonary/Chest: Effort normal and breath sounds normal. No stridor. No respiratory distress. He has no wheezes. He has no rales. Abdominal: Soft. Bowel sounds are normal. He exhibits no distension and no mass. There is no tenderness. There is no guarding. Musculoskeletal: Normal range of motion. Neurological: He is alert. No cranial nerve deficit. Coordination normal.   Oriented to self and place but not time   Skin: Skin is warm and dry. Nursing note and vitals reviewed. MDM  Number of Diagnoses or Management Options  Hypotension due to drugs:   Diagnosis management comments: Checked a rectal temp and patient is okay afebrile rectally. Suspect this hypotension is probably a combination of decreased p.o. intake and his multiple blood pressure medications. Amount and/or Complexity of Data Reviewed  Clinical lab tests: ordered and reviewed  Tests in the radiology section of CPT®: ordered and reviewed  Obtain history from someone other than the patient: yes (Wife)  Review and summarize past medical records: yes  Independent visualization of images, tracings, or specimens: yes (EKG)    Patient Progress  Patient progress: stable         Procedures      ED EKG interpretation:  Rhythm: sinus regular Rate (approx.): 70; Axis: normal; P wave: normal; QRS interval: normal ; ST/T wave: non-specific changes  EKG documented by Raven Gamble MD, scribe, as interpreted by Sylvia Salvador MD, ED MD.    6:31 PM  Patient is not orthostatic after 500 mils of fluid. His BNP is normal despite the chest x-ray reading of pulmonary edema.   His sats are 97% on room air and his lungs are clear. His last echo from 2016 had a normal EF. I will recommend he follow-up with Dr. Festus Arechiga as an outpatient and at this point will continue his Lasix given this questionable x-ray read and stop his triamterene hydrochlorothiazide combination medication. Wife reports up until about 2 weeks ago he was not taking all of these blood pressure medications and 1 of the medications was added back on which she thinks was the Lasix for fluid. She will keep a blood pressure log at home for her follow-up. He did also have some trazodone and Risperdal recently added and trazodone can cause orthostatic hypotension however I think the first thing would be to stop his excessive blood pressure medications    6:38 PM  Patient's results have been reviewed with them. Patient and/or family have verbally conveyed their understanding and agreement of the patient's signs, symptoms, diagnosis, treatment and prognosis and additionally agree to follow up as recommended or return to the Emergency Room should their condition change prior to follow-up. Discharge instructions have also been provided to the patient with some educational information regarding their diagnosis as well a list of reasons why they would want to return to the ER prior to their follow-up appointment should their condition change.

## 2019-09-16 ENCOUNTER — TELEPHONE (OUTPATIENT)
Dept: CARDIOLOGY CLINIC | Age: 84
End: 2019-09-16

## 2019-09-16 LAB
ATRIAL RATE: 71 BPM
CALCULATED R AXIS, ECG10: 16 DEGREES
CALCULATED T AXIS, ECG11: 39 DEGREES
DIAGNOSIS, 93000: NORMAL
Q-T INTERVAL, ECG07: 388 MS
QRS DURATION, ECG06: 88 MS
QTC CALCULATION (BEZET), ECG08: 421 MS
VENTRICULAR RATE, ECG03: 71 BPM

## 2019-09-16 NOTE — TELEPHONE ENCOUNTER
Per Grecia Yang NP Patient was seen in the ER with hypotension, had been put on antihypertensives elsewhere. [employer] off BP med now, but needs follow up sooner than currently scheduled. Attempted to reach patient by telephone.  A message was left for return call to schedule hospital follow up with Grecia Yang NP

## 2019-09-16 NOTE — LETTER
10/4/2019 3:39 PM 
 
Mr. Briseida [address] [facility] [address] We have been trying to reach you via phone but have been unsuccessful. We have been trying to reach you to schedule a follow up appointment due to your recent ER visit. Please call our office at [phone] to schedule an appointment.   
 
 
 
Sincerely, 
 
 
Kemi Cannon MD

## 2019-09-25 NOTE — TELEPHONE ENCOUNTER
Attempted to reach patient by home telephone. A message was left for return call. Attempted to reach patient by mobile telephone. A message was left for return call.

## 2019-10-04 NOTE — TELEPHONE ENCOUNTER
Attempted to reach patient by telephone. A message was left for return call. Will mail letter to patient to call and schedule follow up appointment.

## 2019-11-06 ENCOUNTER — OFFICE VISIT (OUTPATIENT)
Dept: CARDIOLOGY CLINIC | Age: 84
End: 2019-11-06

## 2019-11-06 DIAGNOSIS — Z95.0 CARDIAC PACEMAKER IN SITU: Primary | ICD-10-CM

## 2020-01-01 ENCOUNTER — OFFICE VISIT (OUTPATIENT)
Dept: CARDIOLOGY CLINIC | Age: 85
End: 2020-01-01

## 2020-01-01 ENCOUNTER — CLINICAL SUPPORT (OUTPATIENT)
Dept: CARDIOLOGY CLINIC | Age: 85
End: 2020-01-01

## 2020-01-01 ENCOUNTER — OFFICE VISIT (OUTPATIENT)
Dept: CARDIOLOGY CLINIC | Age: 85
End: 2020-01-01
Payer: MEDICARE

## 2020-01-01 VITALS
BODY MASS INDEX: 26.34 KG/M2 | HEIGHT: 70 IN | DIASTOLIC BLOOD PRESSURE: 72 MMHG | HEART RATE: 65 BPM | WEIGHT: 184 LBS | SYSTOLIC BLOOD PRESSURE: 170 MMHG

## 2020-01-01 DIAGNOSIS — I10 ESSENTIAL HYPERTENSION, BENIGN: ICD-10-CM

## 2020-01-01 DIAGNOSIS — I25.10 ATHEROSCLEROSIS OF NATIVE CORONARY ARTERY OF NATIVE HEART WITHOUT ANGINA PECTORIS: ICD-10-CM

## 2020-01-01 DIAGNOSIS — Z95.0 CARDIAC PACEMAKER IN SITU: Primary | ICD-10-CM

## 2020-01-01 DIAGNOSIS — Z95.1 HX OF CABG: ICD-10-CM

## 2020-01-01 DIAGNOSIS — I48.0 PAF (PAROXYSMAL ATRIAL FIBRILLATION) (HCC): ICD-10-CM

## 2020-01-01 DIAGNOSIS — I49.5 SINUS NODE DYSFUNCTION (HCC): ICD-10-CM

## 2020-01-01 PROCEDURE — 93296 REM INTERROG EVL PM/IDS: CPT | Performed by: INTERNAL MEDICINE

## 2020-01-01 PROCEDURE — 93294 REM INTERROG EVL PM/LDLS PM: CPT | Performed by: INTERNAL MEDICINE

## 2020-01-01 RX ORDER — POTASSIUM CHLORIDE 750 MG/1
TABLET, FILM COATED, EXTENDED RELEASE ORAL
COMMUNITY
Start: 2020-01-13

## 2020-01-01 RX ORDER — TRAZODONE HYDROCHLORIDE 50 MG/1
TABLET ORAL
COMMUNITY
Start: 2020-01-11

## 2020-01-01 RX ORDER — METOPROLOL SUCCINATE 50 MG/1
TABLET, EXTENDED RELEASE ORAL
Qty: 90 TAB | Refills: 1 | Status: SHIPPED | OUTPATIENT
Start: 2020-01-01 | End: 2021-01-01

## 2020-01-01 RX ORDER — METOPROLOL SUCCINATE 50 MG/1
50 TABLET, EXTENDED RELEASE ORAL DAILY
Qty: 30 TAB | Refills: 5 | Status: SHIPPED | OUTPATIENT
Start: 2020-01-01 | End: 2020-01-01

## 2020-01-01 RX ORDER — RISPERIDONE 0.5 MG/1
TABLET, FILM COATED ORAL
COMMUNITY
Start: 2020-01-11

## 2020-02-06 NOTE — PROGRESS NOTES
Cardiac Electrophysiology Office Note     Subjective:      Mary Tinajero is a 80 y.o. man who is here for management of his pacemaker and he also sees me for CAD hx of CABG  The patient is here with his wife. He is hard of hearing. 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 Dewayne Casiano for sick sinus syndrome and symptoms of fatigue, sinus bradycardia. The patient has paroxysmal atrial fibrillation on medical therapy, Sotalol. He has not been taking this medication for months and his wife said he does not see Dr Dewayne Casiano either. He lives near [facility] and had been having pacer checked at [address] office although he prefers to see me at [facility] office as it is more convenient to come this way via [address]  He has diabetes mellitus and prior CABG with Dr Bibiana Chan years ago  He is taking aspirin     He is followed by Dr Wendi Coombs, pulmonary associates. Lexiscan stress test 12/2016 normal LVEF and no ischemia. Echo 7/2016 LVEF 60 % to 65 %. No RWMA. Aortic sclerosis. Patient Active Problem List   Diagnosis Code    Coronary atherosclerosis of native coronary artery I25.10    Postsurgical aortocoronary bypass status Z95.1    Essential hypertension, benign I10    Other and unspecified hyperlipidemia E78.5    Type II or unspecified type diabetes mellitus without mention of complication, not stated as uncontrolled E11.9    S/P placement of cardiac pacemaker Z95.0    Sinus node dysfunction (HCC) I49.5    PAF (paroxysmal atrial fibrillation) (HCC) I48.0     Current Outpatient Medications   Medication Sig Dispense Refill    risperiDONE (RISPERDAL) 0.5 mg tablet       traZODone (DESYREL) 50 mg tablet       potassium chloride SR (KLOR-CON 10) 10 mEq tablet       metoprolol succinate (TOPROL-XL) 50 mg XL tablet Take 1 Tab by mouth daily.  30 Tab 5    traMADol (ULTRAM) 50 mg tablet Take 1 Tab by mouth every six (6) hours as needed for Pain. Max Daily Amount: 200 mg. 20 Tab 0    rosuvastatin (CRESTOR) 40 mg tablet Take 1 Tab by mouth daily. 90 Tab 1    furosemide (LASIX) 20 mg tablet Take 20 mg by mouth daily.  ramipril (ALTACE) 10 mg capsule Take 10 mg by mouth daily.  aspirin delayed-release 81 mg tablet Take  by mouth daily.  pioglitazone (ACTOS) 30 mg tablet Take  by mouth daily. Allergies   Allergen Reactions    Penicillins Rash     Past Medical History:   Diagnosis Date    Atrial fibrillation ([facility]     CAD (coronary artery disease)     Cataracts, bilateral     Coronary atherosclerosis of native coronary artery     Essential hypertension, benign     Other and unspecified hyperlipidemia     [facility]    Postsurgical aortocoronary bypass status     Pre-operative cardiovascular examination     Type II or unspecified type diabetes mellitus without mention of complication, not stated as uncontrolled      Past Surgical History:   Procedure Laterality Date    CARDIAC SURG PROCEDURE UNLIST      bypass    HX CHOLECYSTECTOMY      HX ORTHOPAEDIC      back    HX PACEMAKER      INS PPM/ICD LED DUAL ONLY  2013          No family history of pacer   Social History     Tobacco Use    Smoking status: Former Smoker     Last attempt to quit: 1972     Years since quittin.7    Smokeless tobacco: Never Used   Substance Use Topics    Alcohol use: No        Review of Systems:   Constitutional: Negative for fever, chills, weight loss, + malaise/fatigue. HEENT: Negative for nosebleeds, vision changes. Respiratory: Negative for cough, hemoptysis, sputum production, and wheezing. Cardiovascular: Negative for syncope, and PND. Gastrointestinal: Negative for nausea, vomiting, diarrhea, blood in stool and melena. Genitourinary: Negative for dysuria, and hematuria.    Musculoskeletal: Negative for myalgias   Skin: Negative for rash. Heme bruise easily. Neurological: Negative for speech change and focal weakness + previous falls     Objective:     Visit Vitals  /72   Pulse 65   Ht 5' 10\" (1.778 m)   Wt 184 lb (83.5 kg)   BMI 26.40 kg/m²        Physical Exam:   Constitutional: Well-nourished. No distress. Head: Normocephalic and atraumatic. Eyes: Pupils are equal, round  Neck: supple. No JVD present. Cardiovascular: normal rate, regular rhythm and normal heart sounds. Exam reveals no gallop and no friction rub. 2/6 systolic murmur heard. Pulmonary/Chest: Effort normal and clear lungs  Abdominal: Soft, no tenderness. Musculoskeletal: no edema. Neurological: alert,oriented. Skin: Skin is warm and dry unremarkable left sided pacer  Psychiatric: normal mood and affect. Behavior is normal. Judgment and thought content normal.          Assessment/Plan:       ICD-10-CM ICD-9-CM    1. Cardiac pacemaker in situ Z95.0 V45.01 ECHO ADULT COMPLETE   2. PAF (paroxysmal atrial fibrillation) ([facility]) I48.0 427.31 ECHO ADULT COMPLETE   3. Sinus node dysfunction ([facility]) I49.5 427.81 ECHO ADULT COMPLETE   4. Hx of CABG Z95.1 V45.81 ECHO ADULT COMPLETE   5. Essential hypertension, benign I10 401.1 ECHO ADULT COMPLETE   6. Atherosclerosis of native coronary artery of native heart without angina pectoris I25.10 414.01 ECHO ADULT COMPLETE      reviewed medications and side effects in detail   [facility] cardiolite stress test 12/2016  No ischemia  LVEF 63%     20 PAT/atrial fibrillation with RVR to 50 min  RA pacing    No OAC d/t hx of falls in the past and recent enphalopathy. He on ASA.  I explained to his wife  Need to control BP and heart rate so I recommend toprol  Echo next for LVEF       Future Appointments   Date Time Provider Renetta Mcbride   5/20/2020  3:15 PM REMOTE1, [address]   8/24/2020  1:30 PM REMOTE1, [address]   11/25/2020  8:45 AM REMOTE1Tamia EDDIE SCHED   3/4/2021 11:15 AM PACEMAKER3, [address]   3/4/2021 11:40 AM Parag Maciel  [address]       Thank you for involving me in this patient's care and please call with further concerns or questions. Alie Brizuela M.D.   Electrophysiology/Cardiology  [facility]  [facility], [place] [address], Brennen Cedeño [address] [place] [address]  [phone]

## 2021-01-01 ENCOUNTER — TELEPHONE (OUTPATIENT)
Dept: CARDIOLOGY CLINIC | Age: 86
End: 2021-01-01

## 2021-01-01 RX ORDER — METOPROLOL SUCCINATE 50 MG/1
TABLET, EXTENDED RELEASE ORAL
Qty: 90 TAB | Refills: 1 | Status: SHIPPED | OUTPATIENT
Start: 2021-01-01

## 2021-01-20 NOTE — TELEPHONE ENCOUNTER
Verified patient with two types of identifiers. Spoke with patient's wife who reports patient is currently admitted to [facility] with sepsis. Wife, unfortunately, does not think patient is going to make it saying \"his body is shutting down. \" Ms. Alaniz wanted to know what they needed to do regarding the PPM. Explained to Ms. Alaniz that the PPM does not need to be turned off. Wife just does not want patient to be in agony. explained the difference between ICD and PPM. Wife wanted to know what to do with remote box if patient does pass. Notified wife she can just bring it to our office and we will take care of it. Will notify MD of patient's current admission and condition.

## 2021-01-25 NOTE — TELEPHONE ENCOUNTER
Received refill request for Toprol XL 50 mg tabs. Refill authorized.     Future Appointments   Date Time Provider Renetta Mcbride   3/4/2021 11:00 AM ECHOMARTINA AMB   3/4/2021 11:15 AM PACEMAKER3MARTINA AMB   3/4/2021 11:40 AM TurkMD KEREN Helms AMB